# Patient Record
Sex: FEMALE | Race: BLACK OR AFRICAN AMERICAN | NOT HISPANIC OR LATINO | Employment: OTHER | URBAN - METROPOLITAN AREA
[De-identification: names, ages, dates, MRNs, and addresses within clinical notes are randomized per-mention and may not be internally consistent; named-entity substitution may affect disease eponyms.]

---

## 2017-06-29 ENCOUNTER — OFFICE VISIT (OUTPATIENT)
Dept: UROLOGY | Facility: CLINIC | Age: 67
End: 2017-06-29
Payer: MEDICARE

## 2017-06-29 VITALS
BODY MASS INDEX: 31.42 KG/M2 | HEART RATE: 68 BPM | HEIGHT: 64 IN | WEIGHT: 184.06 LBS | DIASTOLIC BLOOD PRESSURE: 70 MMHG | SYSTOLIC BLOOD PRESSURE: 112 MMHG | RESPIRATION RATE: 14 BRPM

## 2017-06-29 DIAGNOSIS — N28.1 RENAL CYST: Primary | ICD-10-CM

## 2017-06-29 PROCEDURE — 99999 PR PBB SHADOW E&M-EST. PATIENT-LVL III: CPT | Mod: PBBFAC,,, | Performed by: UROLOGY

## 2017-06-29 PROCEDURE — 99213 OFFICE O/P EST LOW 20 MIN: CPT | Mod: PBBFAC | Performed by: UROLOGY

## 2017-06-29 PROCEDURE — 1126F AMNT PAIN NOTED NONE PRSNT: CPT | Mod: ,,, | Performed by: UROLOGY

## 2017-06-29 PROCEDURE — 99213 OFFICE O/P EST LOW 20 MIN: CPT | Mod: S$PBB,,, | Performed by: UROLOGY

## 2017-06-29 PROCEDURE — 1159F MED LIST DOCD IN RCRD: CPT | Mod: ,,, | Performed by: UROLOGY

## 2017-06-29 RX ORDER — HYDRALAZINE HYDROCHLORIDE 25 MG/1
TABLET, FILM COATED ORAL
COMMUNITY
Start: 2017-05-01

## 2017-06-29 RX ORDER — SPIRONOLACTONE 25 MG/1
TABLET ORAL
COMMUNITY
Start: 2017-05-05

## 2017-06-29 NOTE — PROGRESS NOTES
" Samira Moon is a 67 y.o. female who is an established pt who was referred by Dr Goodson for evaluation of renal cyst and abnormal bladder US.      She underwent renal complete US for known renal cysts. ANGY (10/16/14) showed 5cm exophytic cyst in R upper pole. No L renal cysts identified. No hydronephrosis. No stones noted. US did mention "layering hyperechogenicity within bladder lumen, which could represent debris within the bladder lumen. No bladder wall thickening is clearly identified." She was sent for possible cystoscopy for this.     She does not notice sediment in urine. No issues with UTI. Rare UUI, not bothersome. No hematuria, dysuria. Cr - 0.67.    Ucx and cytology negative from last visit. No LUTS.    Complete ANGY was done 12/15/15 that showed large pelvic mass. CT was then ordered that showed 7.6cm mass that was likely an ovarian teratoma (12/30/15). She was notified via telephone. She was recommended to see gyn for this.      ANGY also showed suspicious for 1.1cm stone in LLP. Large simple cyst in R UP (5.1cm - stable). 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     ANGY at  5/17 - 5.5cm simple cyst in R UP, no comment on any stones.       The following portions of the patient's history were reviewed and updated as appropriate: allergies, current medications, past family history, past medical history, past social history, past surgical history and problem list.    Review of Systems  Constitutional: no fever or chills  ENT: no nasal congestion or sore throat  Respiratory: no cough or shortness of breath  Cardiovascular: no chest pain or palpitations  Gastrointestinal: no nausea or vomiting, tolerating diet  Genitourinary: as per HPI  Hematologic/Lymphatic: no easy bruising or lymphadenopathy  Musculoskeletal: no arthralgias or myalgias  Skin: no rashes or lesions  Neurological: no seizures or tremors  Behavioral/Psych: " "no auditory or visual hallucinations       Objective:    Vitals: /70   Pulse 68   Resp 14   Ht 5' 4" (1.626 m)   Wt 83.5 kg (184 lb 1.4 oz)   BMI 31.60 kg/m²     Physical Exam   General: well developed, well nourished in no acute distress  Head: normocephalic, atraumatic  Neck: supple, trachea midline  HEENT: EOMI, mucus membranes moist, sclera anicteric, no hearing impairment  Lungs: symmetric expansion, non-labored breathing  Cardiovascular: regular rate and rhythm, normal pulses  Abdomen: soft, non tender, non distended, no palpable masses, no hernias  Musculoskeletal: no peripheral edema, normal ROM  Lymphatics: no cervical or inguinal lymphadenopathy  Skin: no rashes or lesions  Neuro: alert and oriented x 3, no gross deficits  Psych: normal judgment and insight, normal mood/affect and non-anxious  Genitourinary:   patient declined exam      Lab Review   Urine analysis shows positive for trace ketones, 5-10 RBCs    Lab Results   Component Value Date    WBC 4.92 02/20/2009    HGB 13.1 02/20/2009    HCT 40.3 02/20/2009    MCV 84.7 02/20/2009     02/20/2009     Lab Results   Component Value Date    CREATININE 0.7 12/30/2015    BUN 9 05/15/2008       Imaging  ANGY/CT reviewed         Assessment/Plan:      1. Renal cyst   - Simple cyst, stable over 1 year. She requests f/u imaging of this in 1 year that I think is completely reasonable.    - Calcification on US appears to be vascular rather than in collecting system   - No need for cystoscopy at this time.   - Large pelvic mass - ovarian teratoma. S/p REE-BSO last year.        Follow up in 1 year      "

## 2019-12-02 ENCOUNTER — OFFICE VISIT (OUTPATIENT)
Dept: UROLOGY | Facility: CLINIC | Age: 69
End: 2019-12-02
Attending: UROLOGY
Payer: MEDICARE

## 2019-12-02 VITALS
BODY MASS INDEX: 32.44 KG/M2 | RESPIRATION RATE: 16 BRPM | HEIGHT: 64 IN | DIASTOLIC BLOOD PRESSURE: 60 MMHG | HEART RATE: 64 BPM | WEIGHT: 190 LBS | SYSTOLIC BLOOD PRESSURE: 100 MMHG

## 2019-12-02 DIAGNOSIS — N28.1 RENAL CYST: Primary | ICD-10-CM

## 2019-12-02 PROCEDURE — 1101F PT FALLS ASSESS-DOCD LE1/YR: CPT | Mod: CPTII,S$GLB,, | Performed by: UROLOGY

## 2019-12-02 PROCEDURE — 1159F MED LIST DOCD IN RCRD: CPT | Mod: S$GLB,,, | Performed by: UROLOGY

## 2019-12-02 PROCEDURE — 99999 PR PBB SHADOW E&M-EST. PATIENT-LVL III: CPT | Mod: PBBFAC,,, | Performed by: UROLOGY

## 2019-12-02 PROCEDURE — 99213 PR OFFICE/OUTPT VISIT, EST, LEVL III, 20-29 MIN: ICD-10-PCS | Mod: S$GLB,,, | Performed by: UROLOGY

## 2019-12-02 PROCEDURE — 1159F PR MEDICATION LIST DOCUMENTED IN MEDICAL RECORD: ICD-10-PCS | Mod: S$GLB,,, | Performed by: UROLOGY

## 2019-12-02 PROCEDURE — 99999 PR PBB SHADOW E&M-EST. PATIENT-LVL III: ICD-10-PCS | Mod: PBBFAC,,, | Performed by: UROLOGY

## 2019-12-02 PROCEDURE — 99213 OFFICE O/P EST LOW 20 MIN: CPT | Mod: S$GLB,,, | Performed by: UROLOGY

## 2019-12-02 PROCEDURE — 1126F AMNT PAIN NOTED NONE PRSNT: CPT | Mod: S$GLB,,, | Performed by: UROLOGY

## 2019-12-02 PROCEDURE — 1101F PR PT FALLS ASSESS DOC 0-1 FALLS W/OUT INJ PAST YR: ICD-10-PCS | Mod: CPTII,S$GLB,, | Performed by: UROLOGY

## 2019-12-02 PROCEDURE — 1126F PR PAIN SEVERITY QUANTIFIED, NO PAIN PRESENT: ICD-10-PCS | Mod: S$GLB,,, | Performed by: UROLOGY

## 2019-12-02 RX ORDER — PSYLLIUM HUSK 0.4 G
600 CAPSULE ORAL
COMMUNITY
End: 2021-06-08

## 2019-12-02 RX ORDER — MAGNESIUM 250 MG
250 TABLET ORAL
COMMUNITY

## 2019-12-02 RX ORDER — DEXTROMETHORPHAN HYDROBROMIDE, GUAIFENESIN 5; 100 MG/5ML; MG/5ML
650 LIQUID ORAL
COMMUNITY

## 2019-12-02 RX ORDER — ALPHA LIPOIC ACID 300 MG
300 CAPSULE ORAL
COMMUNITY

## 2019-12-02 RX ORDER — ROSUVASTATIN CALCIUM 20 MG/1
TABLET, COATED ORAL
COMMUNITY
Start: 2019-10-23

## 2019-12-02 RX ORDER — BIOTIN 1 MG
800 TABLET ORAL
COMMUNITY
End: 2023-05-01

## 2019-12-02 RX ORDER — GLUCOSAMINE/D3/BOSWELLIA SERRA 1500MG-400
TABLET ORAL
COMMUNITY
End: 2021-06-08

## 2019-12-02 RX ORDER — UREA 10 %
500 LOTION (ML) TOPICAL
COMMUNITY

## 2019-12-02 RX ORDER — METOPROLOL SUCCINATE 100 MG/1
TABLET, EXTENDED RELEASE ORAL
COMMUNITY
Start: 2019-10-23 | End: 2019-12-02 | Stop reason: SDUPTHER

## 2019-12-02 RX ORDER — AMPICILLIN TRIHYDRATE 250 MG
600 CAPSULE ORAL
COMMUNITY

## 2019-12-02 RX ORDER — EPINEPHRINE 0.22MG
100 AEROSOL WITH ADAPTER (ML) INHALATION
COMMUNITY

## 2019-12-02 RX ORDER — MULTIVITAMIN
1 TABLET ORAL
COMMUNITY

## 2019-12-02 RX ORDER — GUAIFENESIN AND PHENYLEPHRINE HCL 400; 10 MG/1; MG/1
500 TABLET ORAL
COMMUNITY

## 2019-12-02 RX ORDER — ACETAMINOPHEN, DIPHENHYDRAMINE HCL, PHENYLEPHRINE HCL 325; 25; 5 MG/1; MG/1; MG/1
10 TABLET ORAL
COMMUNITY

## 2019-12-02 NOTE — PROGRESS NOTES
" Samira Moon is a 69 y.o. female who is an established pt who was referred by Dr Goodson for evaluation of renal cyst and abnormal bladder US.      She underwent renal complete US for known renal cysts. ANGY (10/16/14) showed 5cm exophytic cyst in R upper pole. No L renal cysts identified. No hydronephrosis. No stones noted. US did mention "layering hyperechogenicity within bladder lumen, which could represent debris within the bladder lumen. No bladder wall thickening is clearly identified." She was sent for possible cystoscopy for this.     She does not notice sediment in urine. No issues with UTI. Rare UUI, not bothersome. No hematuria, dysuria. Cr - 0.67.    Ucx and cytology negative from last visit. No LUTS.    Complete ANGY was done 12/15/15 that showed large pelvic mass. CT was then ordered that showed 7.6cm mass that was likely an ovarian teratoma (12/30/15). She was notified via telephone. She was recommended to see gyn for this.      ANGY also showed suspicious for 1.1cm stone in LLP. Large simple cyst in R UP (5.1cm - stable). 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     ANGY at  5/17 - 5.5cm simple cyst in R UP, no comment on any stones.     12/2/2019  Last seen > 2 years ago. No recent imaging performed. Denies any  issues.       The following portions of the patient's history were reviewed and updated as appropriate: allergies, current medications, past family history, past medical history, past social history, past surgical history and problem list.    Review of Systems  Constitutional: no fever or chills  ENT: no nasal congestion or sore throat  Respiratory: no cough or shortness of breath  Cardiovascular: no chest pain or palpitations  Gastrointestinal: no nausea or vomiting, tolerating diet  Genitourinary: as per HPI  Hematologic/Lymphatic: no easy bruising or lymphadenopathy  Musculoskeletal: no arthralgias or " "myalgias  Skin: no rashes or lesions  Neurological: no seizures or tremors  Behavioral/Psych: no auditory or visual hallucinations       Objective:    Vitals: /60   Pulse 64   Resp 16   Ht 5' 4" (1.626 m)   Wt 86.2 kg (190 lb)   BMI 32.61 kg/m²     Physical Exam   General: well developed, well nourished in no acute distress  Head: normocephalic, atraumatic  Neck: supple, trachea midline  HEENT: EOMI, mucus membranes moist, sclera anicteric, no hearing impairment  Lungs: symmetric expansion, non-labored breathing  Cardiovascular: regular rate and rhythm, normal pulses  Abdomen: soft, non tender, non distended, no palpable masses, no hernias  Musculoskeletal: no peripheral edema, normal ROM  Lymphatics: no cervical or inguinal lymphadenopathy  Skin: no rashes or lesions  Neuro: alert and oriented x 3, no gross deficits  Psych: normal judgment and insight, normal mood/affect and non-anxious  Genitourinary:   patient declined exam      Lab Review   Urine analysis shows positive for trace protein    Lab Results   Component Value Date    WBC 4.92 02/20/2009    HGB 13.1 02/20/2009    HCT 40.3 02/20/2009    MCV 84.7 02/20/2009     02/20/2009     Lab Results   Component Value Date    CREATININE 0.7 12/30/2015    BUN 9 05/15/2008       Imaging  ANGY/CT reviewed         Assessment/Plan:      1. Renal cyst   - Simple cyst, stable over 1 year. She requests f/u imaging of this in 1 year that I think is completely reasonable - she did not follow up until now > 2 years later   - Calcification on US appears to be vascular rather than in collecting system   - No need for cystoscopy at this time.   - Large pelvic mass - ovarian teratoma. S/p REE-BSO 2016.    - Will arrange for ANGY now and 1 year       Follow up in 1 year      "

## 2019-12-04 ENCOUNTER — HOSPITAL ENCOUNTER (OUTPATIENT)
Dept: RADIOLOGY | Facility: HOSPITAL | Age: 69
Discharge: HOME | End: 2019-12-04
Attending: ORTHOPAEDIC SURGERY
Payer: MEDICARE

## 2019-12-04 ENCOUNTER — OFFICE VISIT (OUTPATIENT)
Dept: ORTHOPEDICS | Facility: CLINIC | Age: 69
End: 2019-12-04
Payer: MEDICARE

## 2019-12-04 DIAGNOSIS — M72.2 PLANTAR FASCIITIS OF LEFT FOOT: ICD-10-CM

## 2019-12-04 DIAGNOSIS — M79.672 FOOT PAIN, BILATERAL: Primary | ICD-10-CM

## 2019-12-04 DIAGNOSIS — M79.672 ARCH PAIN OF LEFT FOOT: ICD-10-CM

## 2019-12-04 DIAGNOSIS — M79.671 FOOT PAIN, BILATERAL: ICD-10-CM

## 2019-12-04 DIAGNOSIS — M79.671 FOOT PAIN, BILATERAL: Primary | ICD-10-CM

## 2019-12-04 DIAGNOSIS — M79.672 FOOT PAIN, BILATERAL: ICD-10-CM

## 2019-12-04 PROCEDURE — 99203 PR OFFICE/OUTPT VISIT, NEW, LEVL III, 30-44 MIN: ICD-10-PCS | Mod: S$GLB,,, | Performed by: ORTHOPAEDIC SURGERY

## 2019-12-04 PROCEDURE — 73630 X-RAY EXAM OF FOOT: CPT | Mod: 50,TC

## 2019-12-04 PROCEDURE — 99999 PR PBB SHADOW E&M-EST. PATIENT-LVL II: CPT | Mod: PBBFAC,,, | Performed by: ORTHOPAEDIC SURGERY

## 2019-12-04 PROCEDURE — 99999 PR PBB SHADOW E&M-EST. PATIENT-LVL II: ICD-10-PCS | Mod: PBBFAC,,, | Performed by: ORTHOPAEDIC SURGERY

## 2019-12-04 PROCEDURE — 73630 XR FOOT COMPLETE 3 VIEW BILATERAL: ICD-10-PCS | Mod: 26,50,, | Performed by: RADIOLOGY

## 2019-12-04 PROCEDURE — 1101F PR PT FALLS ASSESS DOC 0-1 FALLS W/OUT INJ PAST YR: ICD-10-PCS | Mod: CPTII,S$GLB,, | Performed by: ORTHOPAEDIC SURGERY

## 2019-12-04 PROCEDURE — 73630 X-RAY EXAM OF FOOT: CPT | Mod: 26,50,, | Performed by: RADIOLOGY

## 2019-12-04 PROCEDURE — 1101F PT FALLS ASSESS-DOCD LE1/YR: CPT | Mod: CPTII,S$GLB,, | Performed by: ORTHOPAEDIC SURGERY

## 2019-12-04 PROCEDURE — 99203 OFFICE O/P NEW LOW 30 MIN: CPT | Mod: S$GLB,,, | Performed by: ORTHOPAEDIC SURGERY

## 2019-12-04 PROCEDURE — 1159F PR MEDICATION LIST DOCUMENTED IN MEDICAL RECORD: ICD-10-PCS | Mod: S$GLB,,, | Performed by: ORTHOPAEDIC SURGERY

## 2019-12-04 PROCEDURE — 1159F MED LIST DOCD IN RCRD: CPT | Mod: S$GLB,,, | Performed by: ORTHOPAEDIC SURGERY

## 2019-12-04 NOTE — PROGRESS NOTES
DATE: 12/4/2019  PATIENT: Samira Moon    CHIEF COMPLAINT: bilateral foot pain    HISTORY:  Samira Moon is a 69 y.o. female  here for initial evaluation of left worse than right foot pain. The pain has been present for 3 months. The patient describes the pain as a pea under her arch.  The pain is worse with walking, worst with first step of the day and improved by rest. There is no associated numbness and tingling.  Prior treatments have included steroid dose pack which helped.      PAST MEDICAL/SURGICAL HISTORY:  Past Medical History:   Diagnosis Date    Cancer     right breast    GERD (gastroesophageal reflux disease)     Hyperlipemia     Hypertension      Past Surgical History:   Procedure Laterality Date    BREAST SURGERY Right     lumpectomy w/sentinel node biopsy    JOINT REPLACEMENT Left     knee    TOTAL KNEE ARTHROPLASTY      TUBAL LIGATION         Current Medications:   Current Outpatient Medications:     acetaminophen (TYLENOL) 650 MG TbSR, Take 650 mg by mouth., Disp: , Rfl:     alpha lipoic acid 300 mg Cap, Take 300 mg by mouth., Disp: , Rfl:     amlodipine (NORVASC) 10 MG tablet, , Disp: , Rfl:     anastrozole (ARIMIDEX) 1 mg Tab, , Disp: , Rfl:     aspirin (ECOTRIN) 81 MG EC tablet, Take 81 mg by mouth once daily., Disp: , Rfl:     calcium carbonate-vitamin D3 1,000 mg(2,500 mg)-800 unit Tab, Take 600 mg by mouth., Disp: , Rfl:     chromium picolinate 400 mcg Tab, Take 800 mcg by mouth., Disp: , Rfl:     cloNIDine (CATAPRES) 0.2 MG tablet, , Disp: , Rfl:     coenzyme Q10 100 mg capsule, Take 100 mg by mouth., Disp: , Rfl:     docusate sodium (COLACE) 50 MG capsule, Take by mouth., Disp: , Rfl:     FLUZONE HIGH-DOSE 2019-20, PF, 180 mcg/0.5 mL Syrg, TO BE ADMINISTERED BY PHARMACIST FOR IMMUNIZATION, Disp: , Rfl: 0    furosemide (LASIX) 20 MG tablet, , Disp: , Rfl:     glucosamine-D3-Boswellia serr 1,500-400-100 mg-unit-mg Tab, Take by mouth., Disp: , Rfl:     hydrALAZINE  (APRESOLINE) 25 MG tablet, , Disp: , Rfl:     KRILL OIL ORAL, Take by mouth., Disp: , Rfl:     levOCARNitine tartrate (CARNITOR) 250 mg Cap, Take 500 mg by mouth., Disp: , Rfl:     losartan-hydrochlorothiazide 100-25 mg (HYZAAR) 100-25 mg per tablet, , Disp: , Rfl:     magnesium 250 mg Tab, Take 250 mg by mouth., Disp: , Rfl:     melatonin 10 mg Tab, Take 10 mg by mouth., Disp: , Rfl:     metoprolol succinate (TOPROL-XL) 100 MG 24 hr tablet, , Disp: , Rfl:     minoxidil (LONITEN) 2.5 MG tablet, , Disp: , Rfl:     multivitamin (THERAGRAN) per tablet, Take 1 tablet by mouth., Disp: , Rfl:     red yeast rice 600 mg Cap, Take 600 mg by mouth., Disp: , Rfl:     rosuvastatin (CRESTOR) 20 MG tablet, , Disp: , Rfl:     spironolactone (ALDACTONE) 25 MG tablet, , Disp: , Rfl:     tramadol (ULTRAM) 50 mg tablet, Take 50 mg by mouth every 6 (six) hours as needed for Pain., Disp: , Rfl:     turmeric root extract 500 mg Cap, Take 500 mg by mouth., Disp: , Rfl:     Social History:   Social History     Socioeconomic History    Marital status:      Spouse name: Not on file    Number of children: Not on file    Years of education: Not on file    Highest education level: Not on file   Occupational History    Not on file   Social Needs    Financial resource strain: Not on file    Food insecurity:     Worry: Not on file     Inability: Not on file    Transportation needs:     Medical: Not on file     Non-medical: Not on file   Tobacco Use    Smoking status: Never Smoker    Smokeless tobacco: Never Used   Substance and Sexual Activity    Alcohol use: No    Drug use: No    Sexual activity: Yes   Lifestyle    Physical activity:     Days per week: Not on file     Minutes per session: Not on file    Stress: Not on file   Relationships    Social connections:     Talks on phone: Not on file     Gets together: Not on file     Attends Anabaptism service: Not on file     Active member of club or organization: Not  on file     Attends meetings of clubs or organizations: Not on file     Relationship status: Not on file   Other Topics Concern    Not on file   Social History Narrative    Not on file       REVIEW OF SYSTEMS:  Constitution: Negative. Negative for chills, fever and night sweats.   Cardiovascular: Negative for chest pain and syncope.   Respiratory: Negative for cough and shortness of breath.   Gastrointestinal: See HPI. Negative for nausea/vomiting. Negative for abdominal pain.  Genitourinary: See HPI. Negative for discoloration or dysuria.  Skin: Negative for dry skin, itching and rash.   Hematologic/Lymphatic: Negative for bleeding problem. Does not bruise/bleed easily.   Musculoskeletal: Negative for falls and muscle weakness.   Neurological: See HPI. No seizures.   Endocrine: Negative for polydipsia, polyphagia and polyuria.   Allergic/Immunologic: Negative for hives and persistent infections.    PHYSICAL EXAMINATION:    There were no vitals taken for this visit.    General: The patient is a  69 y.o. female in no apparent distress, the patient is orientatied to person, place and time.   Psych: Normal mood and affect  HEENT:  NCAT, sclera nonicteric  Lungs:  Respirations are equal and unlabored.  CV:  2+ bilateral upper and lower extremity pulses.  Skin:  Intact throughout.  Musculoskeletal: No pain with the range of motion of the bilateral hips. No trochanteric tenderness to palpation. No pain with range of motion about the bilateral knees.    Left Lower Extremity Exam    - Skin intact, no deformity, no ecchymoses, no edema  - TTP over medial arch  - Compartments soft and compressible  - ROM full (eversion,inversion,plantar/dorsiflexion)  - TA/EHL/Gastroc/FHL assessed in isolation without deficit  - SILT throughout  - DP and PT palpated  2+  - Capillary Refill <3s    Right Lower Extremity Exam    - Skin intact, no deformity, no ecchymoses, no edema  - TTP over medial arch  - Compartments soft and  compressible  - ROM full (eversion,inversion,plantar/dorsiflexion)  - TA/EHL/Gastroc/FHL assessed in isolation without deficit  - SILT throughout  - DP and PT palpated  2+  - Capillary Refill <3s    Pain with single leg heel rise, but able to perform      IMAGING:     Radiographs of the bilateral feet were ordered and personally reviewed with the patient today. No bony abnormality     ASSESSMENT/PLAN:    Diagnoses and all orders for this visit:    Foot pain, bilateral  -     X-Ray Foot Complete Bilateral; Future    Arch pain of left foot  -     MRI Foot (Midfoot) Left Without Contrast; Future    Plantar fasciitis of left foot  -     MRI Foot (Midfoot) Left Without Contrast; Future      No follow-ups on file.    MRI left midfoot  Will call with results    I have personally taken the history and examined this patient and agree with the residents note as stated above.  This patient has atypical plantar foot pain without any history of injury.  She describes the feeling of a mass lesion on the plantar aspect of her foot. This may be related to her plantar fascia, but I am not sure so I would like to obtain an MRI of her left foot which is more symptomatic than the right

## 2019-12-06 ENCOUNTER — HOSPITAL ENCOUNTER (OUTPATIENT)
Dept: RADIOLOGY | Facility: HOSPITAL | Age: 69
Discharge: HOME | End: 2019-12-06
Attending: UROLOGY
Payer: MEDICARE

## 2019-12-06 ENCOUNTER — HOSPITAL ENCOUNTER (OUTPATIENT)
Dept: RADIOLOGY | Facility: HOSPITAL | Age: 69
Discharge: HOME | End: 2019-12-06
Attending: ORTHOPAEDIC SURGERY
Payer: MEDICARE

## 2019-12-06 DIAGNOSIS — N28.1 RENAL CYST: ICD-10-CM

## 2019-12-06 DIAGNOSIS — M72.2 PLANTAR FASCIITIS OF LEFT FOOT: ICD-10-CM

## 2019-12-06 DIAGNOSIS — M79.672 ARCH PAIN OF LEFT FOOT: ICD-10-CM

## 2019-12-06 PROCEDURE — 76770 US EXAM ABDO BACK WALL COMP: CPT | Mod: TC

## 2019-12-06 PROCEDURE — 73718 MRI LOWER EXTREMITY W/O DYE: CPT | Mod: 26,LT,, | Performed by: RADIOLOGY

## 2019-12-06 PROCEDURE — 76770 US RETROPERITONEAL COMPLETE: ICD-10-PCS | Mod: 26,,, | Performed by: RADIOLOGY

## 2019-12-06 PROCEDURE — 76770 US EXAM ABDO BACK WALL COMP: CPT | Mod: 26,,, | Performed by: RADIOLOGY

## 2019-12-06 PROCEDURE — 73718 MRI FOOT (MIDFOOT) LEFT WITHOUT CONTRAST: ICD-10-PCS | Mod: 26,LT,, | Performed by: RADIOLOGY

## 2019-12-06 PROCEDURE — 73718 MRI LOWER EXTREMITY W/O DYE: CPT | Mod: TC,LT

## 2019-12-09 ENCOUNTER — TELEPHONE (OUTPATIENT)
Dept: UROLOGY | Facility: CLINIC | Age: 69
End: 2019-12-09

## 2019-12-09 NOTE — TELEPHONE ENCOUNTER
Please inform pt:    ANGY was normal. Simple cysts noted in R kidney - no need for intervention or surveillance of this.

## 2019-12-09 NOTE — TELEPHONE ENCOUNTER
Spoke to pt advised ultrasound normal just showed simple cyst not intervention needed at this time.[email or web address]

## 2019-12-16 ENCOUNTER — OFFICE VISIT (OUTPATIENT)
Dept: ORTHOPEDICS | Facility: CLINIC | Age: 69
End: 2019-12-16
Payer: MEDICARE

## 2019-12-16 DIAGNOSIS — M19.079 ARTHRITIS OF FOOT: Primary | ICD-10-CM

## 2019-12-16 PROCEDURE — 99999 PR PBB SHADOW E&M-EST. PATIENT-LVL II: CPT | Mod: PBBFAC,,, | Performed by: ORTHOPAEDIC SURGERY

## 2019-12-16 PROCEDURE — 99213 PR OFFICE/OUTPT VISIT, EST, LEVL III, 20-29 MIN: ICD-10-PCS | Mod: S$GLB,,, | Performed by: ORTHOPAEDIC SURGERY

## 2019-12-16 PROCEDURE — 99213 OFFICE O/P EST LOW 20 MIN: CPT | Mod: S$GLB,,, | Performed by: ORTHOPAEDIC SURGERY

## 2019-12-16 PROCEDURE — 1159F MED LIST DOCD IN RCRD: CPT | Mod: S$GLB,,, | Performed by: ORTHOPAEDIC SURGERY

## 2019-12-16 PROCEDURE — 1101F PR PT FALLS ASSESS DOC 0-1 FALLS W/OUT INJ PAST YR: ICD-10-PCS | Mod: CPTII,S$GLB,, | Performed by: ORTHOPAEDIC SURGERY

## 2019-12-16 PROCEDURE — 1101F PT FALLS ASSESS-DOCD LE1/YR: CPT | Mod: CPTII,S$GLB,, | Performed by: ORTHOPAEDIC SURGERY

## 2019-12-16 PROCEDURE — 1159F PR MEDICATION LIST DOCUMENTED IN MEDICAL RECORD: ICD-10-PCS | Mod: S$GLB,,, | Performed by: ORTHOPAEDIC SURGERY

## 2019-12-16 PROCEDURE — 99999 PR PBB SHADOW E&M-EST. PATIENT-LVL II: ICD-10-PCS | Mod: PBBFAC,,, | Performed by: ORTHOPAEDIC SURGERY

## 2019-12-16 NOTE — PROGRESS NOTES
Samira Moon  Returns today for follow-up of the results of her left foot MRI.  This is a 69-year-old female presented to me with bilateral foot pain left worse than right but similar in location and nature.  She complained of pain mainly in the mid part of her foot and arch.  I did not appreciate any obvious structural abnormalities by exam or plain x-ray so I ordered an MRI of her left foot which was her more symptomatic foot.    MRI results:  Findings on the MRI which may correspond to her pain include some degenerative changes of the midfoot as well as some mild thickening of the medial band of the plantar fascia. Otherwise there were no other structural abnormalities that would correlate with her pain.    Her examination remains unchanged from her exam on her last visit    Impression:  1.  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  Possible mild plantar fasciitis    Recommendation:  There are no structural abnormalities that warrant any surgical intervention. I recommended conservative measures for arthritis including nonsteroidal anti-inflammatory medications if she can tolerate them as well as wearing supportive shoes.    Follow-up as needed

## 2020-01-28 ENCOUNTER — OFFICE VISIT (OUTPATIENT)
Dept: ORTHOPEDICS | Facility: CLINIC | Age: 70
End: 2020-01-28
Payer: MEDICARE

## 2020-01-28 DIAGNOSIS — M72.2 PLANTAR FASCIITIS OF LEFT FOOT: ICD-10-CM

## 2020-01-28 DIAGNOSIS — M76.72 PERONEAL TENDINITIS OF LEFT LOWER EXTREMITY: Primary | ICD-10-CM

## 2020-01-28 PROCEDURE — 1101F PR PT FALLS ASSESS DOC 0-1 FALLS W/OUT INJ PAST YR: ICD-10-PCS | Mod: CPTII,S$GLB,, | Performed by: ORTHOPAEDIC SURGERY

## 2020-01-28 PROCEDURE — 1101F PT FALLS ASSESS-DOCD LE1/YR: CPT | Mod: CPTII,S$GLB,, | Performed by: ORTHOPAEDIC SURGERY

## 2020-01-28 PROCEDURE — 99999 PR PBB SHADOW E&M-EST. PATIENT-LVL II: CPT | Mod: PBBFAC,,, | Performed by: ORTHOPAEDIC SURGERY

## 2020-01-28 PROCEDURE — 99999 PR PBB SHADOW E&M-EST. PATIENT-LVL II: ICD-10-PCS | Mod: PBBFAC,,, | Performed by: ORTHOPAEDIC SURGERY

## 2020-01-28 PROCEDURE — 99213 PR OFFICE/OUTPT VISIT, EST, LEVL III, 20-29 MIN: ICD-10-PCS | Mod: S$GLB,,, | Performed by: ORTHOPAEDIC SURGERY

## 2020-01-28 PROCEDURE — 99213 OFFICE O/P EST LOW 20 MIN: CPT | Mod: S$GLB,,, | Performed by: ORTHOPAEDIC SURGERY

## 2020-01-28 PROCEDURE — 1159F MED LIST DOCD IN RCRD: CPT | Mod: S$GLB,,, | Performed by: ORTHOPAEDIC SURGERY

## 2020-01-28 PROCEDURE — 1159F PR MEDICATION LIST DOCUMENTED IN MEDICAL RECORD: ICD-10-PCS | Mod: S$GLB,,, | Performed by: ORTHOPAEDIC SURGERY

## 2020-01-28 NOTE — PROGRESS NOTES
Samira Moon  Returns today for follow-up.  This is a 69-year-old female has had various complaints of her feet and mostly her left foot. She was last here about 6 weeks ago for results of an MRI to evaluate her left foot. The MRI revealed some degenerative changes of the midfoot as well as some mild plantar fasciitis which correlated with some of her symptoms. The MRI also reported some tenosynovitis of the peroneus longus tendon. She states that this past Sunday she had a sharp pain on the lateral plantar aspect of her foot coming out of [place] which show most made her fall.  She states she has had this pain before and it lasted for about 3 hours.  She comes in for evaluation.    Examination:  She walks in with a normal gait. On standing inspection she has plantigrade alignment of her feet.  There is no obvious swelling of either foot. On sitting exam the left foot and ankle reveals full motion of the ankle:  Subtalar joint. She has good function of her peroneal tendons but does report some pain in the region of her symptoms with resistance to plantar flexion of her 1st ray.  This would correlate with peroneus longus tendonitis.  She has minimal tenderness in the midfoot and plantar fascia at this time.    Imaging:  I re-reviewed the MRI and indeed there is some fluid along the course of the peroneus longus tendon as extends into the plantar aspect of the foot.    Impression:  1.  Left peroneal tendinitis                       2.  Left mild midfoot arthritis                       3.  Left plantar fasciitis    Recommendation:  Her recent symptoms and current signs would correlate with peroneus longus pathology. I explained to her that if this persists that this is a tendon that could be removed without any significant functional deficit.  As it only occurs intermittently I suggest that she obtain a cane to use in public to help prevent any potential falls.  I also recommended physical therapy to address the  tendinitis.    Follow-up as needed

## 2020-02-17 ENCOUNTER — CLINICAL SUPPORT (OUTPATIENT)
Dept: REHABILITATION | Facility: HOSPITAL | Age: 70
End: 2020-02-17
Attending: ORTHOPAEDIC SURGERY
Payer: MEDICARE

## 2020-02-17 DIAGNOSIS — M79.672 BILATERAL FOOT PAIN: ICD-10-CM

## 2020-02-17 DIAGNOSIS — M79.671 BILATERAL FOOT PAIN: ICD-10-CM

## 2020-02-17 DIAGNOSIS — R26.89 DECREASED MOBILITY: ICD-10-CM

## 2020-02-17 PROCEDURE — 97140 MANUAL THERAPY 1/> REGIONS: CPT | Mod: PN

## 2020-02-17 PROCEDURE — 97161 PT EVAL LOW COMPLEX 20 MIN: CPT | Mod: PN

## 2020-02-17 NOTE — PLAN OF CARE
[facility]   PATIENT EVALUATION    Name: Samira oMon  Clinic Number: [id number]    Diagnosis:   Encounter Diagnoses   Name Primary?    Bilateral foot pain     Decreased mobility      Physician: Dayron Lechuga MD  Treatment Orders: PT Eval and Treat    History     Past Medical History:   Diagnosis Date    Cancer     right breast    GERD (gastroesophageal reflux disease)     Hyperlipemia     Hypertension      Current Outpatient Medications   Medication Sig    acetaminophen (TYLENOL) 650 MG TbSR Take 650 mg by mouth.    alpha lipoic acid 300 mg Cap Take 300 mg by mouth.    amlodipine (NORVASC) 10 MG tablet     anastrozole (ARIMIDEX) 1 mg Tab     aspirin (ECOTRIN) 81 MG EC tablet Take 81 mg by mouth once daily.    calcium carbonate-vitamin D3 1,000 mg(2,500 mg)-800 unit Tab Take 600 mg by mouth.    chromium picolinate 400 mcg Tab Take 800 mcg by mouth.    cloNIDine (CATAPRES) 0.2 MG tablet     coenzyme Q10 100 mg capsule Take 100 mg by mouth.    docusate sodium (COLACE) 50 MG capsule Take by mouth.    FLUZONE HIGH-DOSE 2019-20, PF, 180 mcg/0.5 mL Syrg TO BE ADMINISTERED BY PHARMACIST FOR IMMUNIZATION    furosemide (LASIX) 20 MG tablet     glucosamine-D3-Boswellia serr 1,500-400-100 mg-unit-mg Tab Take by mouth.    hydrALAZINE (APRESOLINE) 25 MG tablet     KRILL OIL ORAL Take by mouth.    levOCARNitine tartrate (CARNITOR) 250 mg Cap Take 500 mg by mouth.    losartan-hydrochlorothiazide 100-25 mg (HYZAAR) 100-25 mg per tablet     magnesium 250 mg Tab Take 250 mg by mouth.    melatonin 10 mg Tab Take 10 mg by mouth.    metoprolol succinate (TOPROL-XL) 100 MG 24 hr tablet     minoxidil (LONITEN) 2.5 MG tablet     multivitamin (THERAGRAN) per tablet Take 1 tablet by mouth.    red yeast rice 600 mg Cap Take 600 mg by mouth.    rosuvastatin (CRESTOR) 20 MG tablet     spironolactone (ALDACTONE) 25 MG tablet     tramadol (ULTRAM) 50 mg tablet Take 50 mg by mouth every 6 (six)  hours as needed for Pain.    turmeric root extract 500 mg Cap Take 500 mg by mouth.     No current facility-administered medications for this visit.      Review of patient's allergies indicates:   Allergen Reactions    Erythromycin Hives    Penicillins     Oxycodone-acetaminophen Rash       Precautions: standard    Evaluation Date: 2/17/20  Start Time: 1600  Stop Time: 1648  Visit # authorized: 1/1  Authorization period: 1/27/21  Plan of care expiration: 3/30/20  MD referral: Dr. Lechuga    Hx of present illness: Patient bought some new expensive tennis shoes about 6 months ago. She wore them a week or two and her feet got progressively worse. Patient went to podiatrist 2x and to orthopedic surgeon. She received x-ray and MRI. Podiatrist gave her steroid pack which only helped while she was taking it. She has no hx of similar sx and no other tx for this condition.      Subjective     Samira Moon states she has B foot pain, L worse than R in the arch and the outside edge of the foot. She has increased pain stepping on uneven surfaces and feels like she almost falls it hurts so bad. She took a misstep leaving [place] last week and she would have fallen if she wasn't between 2 men. Reports no falls. Patient thinks she can walk about a mile. Patient reports pain first step in the morning. She has B knee replacements.     PLOF: Typically patient goes to the gym and walks on the TM 6x/week (3.8mph at incline of 8) but she hasn't been able to do this recently    Pain:  Location: lateral foot and arch B  Description: aching, sharp  Activities Which Increase Pain: see above  Activities Which Decrease Pain: aleve  Pain Scale: 4/10 now 4/10 at worst 2/10 at best    Physical Therapy Goals: get rid of pain      Objective     Observation: (standing) Patient ambulated into clinic with no AD    Posture: increased pes planus B    Gait: no obvious abnormalities    Ankle Active/Passive ROM: (measured in degrees) WNL  BLE    Strength: (graded 1-5 out of 5) 5/5 BLE except ankle eversion L 4+/5, ankle PF L 3-/5    Special Tests: negative      Joint Mobility: decreased B cuboid and MT     Palpation: TTP B cuboid and MT base    PT reviewed FOTO scores for Samira Moon on 2/17/20  FOTO score: 40% limited    Functional Limitations Reports - G Codes  Category: mobility  Tool: FOTO      Current ():  CK  Goal (): CJ      TREATMENT time separate from evaluation    Time In: 1630  Time Out: 1645  Therapeutic exercise: Samira received therapeutic exercises to develop strength and endurance, flexibility for 5 minutes including:    Towel scrunches x2 min B  Ankle eversion x30 YTB    NV Add:  4-way ankle  Steamboats on foam  Tandem stance on foam  Step ups on foam    Manual therapy: Samira  received the following manual therapy techniques x 10 min.     Dorsal glides B cuboid and MT base grade 2-3  STM B plantar fascia    Pt. Education: Instructed pt. regarding: proper technique with all exercises, body/gait mechanics, diagnosis, prognosis, goals, and POC. Pt demo good understanding of the education provided. Samira demonstrated good return demonstration of activities. No cultural, environmental, or spiritual barriers identified to treatment or learning.        Assessment   Patient is a 69 y.o. female referred to outpatient physical therapy who presents with a PT diagnosis of B foot pain/decreased mobility demonstrating joint dysfunction and functional limitation as described below. Level of complexity is low;  based on patient's past medical history including the below co-morbidities and personal factors; functional limitations, and clinical presentation directly impacting his/her plan of care. Pt demonstrates excellent rehab potential. Pt will benefit from physcial therapy services in order to maximize pain free functional mobility. The following goals were discussed with the patient and patient is in agreement with them as to be  addressed in the treatment plan. Pt was given a HEP consisting of towel scrunches and ankle eversion. Pt verbally understood the instructions as they were given and demonstrated proper form and technique during therapy. Pt was advised to perform these exercises free of pain, and to stop performing them if pain occurs.       History  Co-morbidities and personal factors that may impact the plan of care Examination  Body Structures and Functions, activity limitations and participation restrictions that may impact the plan of care Clinical Presentation   Decision Making/ Complexity Score   Co-morbidities:   Hx breast cancer            Personal Factors:   age Body Regions: B foot    Body Systems:  musculoskeletal      Activity limitations: walking on uneven surfaces      Participation Restrictions: exercise       stable   low       Medical necessity is demonstrated by the following IMPAIRMENTS/PROBLEM LIST:   1) Pain limiting function   2) decreased balance   3) decreased strength   4) decreased joint mobility   5) Lack of HEP    GOALS:   Short Term Goals:  3 weeks  1.Patient will be proficient and compliant with HEP.  2. Decrease pain at worst to no greater than 2/10.  3. Patient will report no pain upon first step in the morning.      Long Term Goals: 6 weeks  1. Patient will be </= 30% limited in mobility according to FOTO.  2. Patient will demonstrate 5/5 BLE strength in order to improve functional mobility.   3. Patient will demonstrate >/= 15 seconds B SLS in order to decrease fall risk.    Plan     Pt will be treated by physical therapy 1-2 times a week for 6 weeks for Pt education, HEP, therapeutic exercises, neuromuscular re-education, manual therapy, dry needling; and modalities prn to achieve established goals. Samira may at times be seen by a PTA as part of the Rehab Team.     I certify the need for these services furnished under this plan of treatment and while under my care.  ______________________________  Physician/Referring Practitioner  Date of Signature    Fifi Shepard, PT, DPT

## 2020-03-02 ENCOUNTER — CLINICAL SUPPORT (OUTPATIENT)
Dept: REHABILITATION | Facility: HOSPITAL | Age: 70
End: 2020-03-02
Attending: ORTHOPAEDIC SURGERY
Payer: MEDICARE

## 2020-03-02 DIAGNOSIS — M79.671 BILATERAL FOOT PAIN: ICD-10-CM

## 2020-03-02 DIAGNOSIS — M79.672 BILATERAL FOOT PAIN: ICD-10-CM

## 2020-03-02 DIAGNOSIS — R26.89 DECREASED MOBILITY: ICD-10-CM

## 2020-03-02 PROCEDURE — 97110 THERAPEUTIC EXERCISES: CPT | Mod: PN

## 2020-03-02 PROCEDURE — 97140 MANUAL THERAPY 1/> REGIONS: CPT | Mod: PN

## 2020-03-02 NOTE — PROGRESS NOTES
"                                                  Physical Therapy Daily Note     Date: 03/02/2020  Name: Samira Moon  Clinic Number: [id number]  Diagnosis:   Encounter Diagnoses   Name Primary?    Bilateral foot pain     Decreased mobility      Physician: Dayron Lechuga MD    Precautions: standard     Evaluation Date: 2/17/20  Start Time: 1500  Stop Time: 1653  Visit # authorized: 2/5  Authorization period: 3/2/21  Plan of care expiration: 3/30/20  MD referral: Dr. Lechuga     Hx of present illness: Patient bought some new expensive tennis shoes about 6 months ago. She wore them a week or two and her feet got progressively worse. Patient went to podiatrist 2x and to orthopedic surgeon. She received x-ray and MRI. Podiatrist gave her steroid pack which only helped while she was taking it. She has no hx of similar sx and no other tx for this condition.      Subjective     Pt reports 4/10 B foot pain pre-tx. Reports she feels like her feet are getting a bit sensitive. Reports compliance with HEP but is a bit confused about the ankle exercise and would like to review.    Objective       Patient received individual therapy to increase strength, endurance, ROM and flexibility with activities as follows:     Samira received therapeutic exercises to develop strength, endurance, ROM and flexibility for 43 minutes including:     NuStep x5 min  Calf raises 2x10  Calf st on slant board x1 min  Steamboats on foam 2x10 B  Tandem stance on foam 2x30" B  Step ups on foam x15 B  BAPS board level 2 x20 clockwise/counter  Towel scrunches x2 min B (at the same time)  Franco x2 min ea  Ankle eversion RTB x30 B        Manual therapy: Samira  received the following manual therapy techniques x 10 min.     Dorsal glides B cuboid and MT base grade 2-3  IASTM B plantar fascia    Written Home Exercises Provided: no new exercises provided this session  Pt demo good understanding of the education provided. Samira demonstrated good return " demonstration of activities.     Education provided: TRINY Hogan verbalized good understanding of education provided.   No spiritual or educational barriers to learning identified.    Assessment     Patient tolerated tx well. Added various exercises to improve balance, foot intrinsic strength and ROM with no reports of increased pain. Patient challenged with balance exercises and required close supervision with step ups due to not picking up feet enough and during tandem stance for LOB. Patient better able to tolerate manual therapy today with no reports of increased pain. Patient will continue to benefit from skilled PT in order to decrease pain, increase strength/ROM, improve balance, and improve functional mobility.    GOALS:   Short Term Goals:  3 weeks  1.Patient will be proficient and compliant with HEP.  2. Decrease pain at worst to no greater than 2/10.  3. Patient will report no pain upon first step in the morning.        Long Term Goals: 6 weeks  1. Patient will be </= 30% limited in mobility according to FOTO.  2. Patient will demonstrate 5/5 BLE strength in order to improve functional mobility.   3. Patient will demonstrate >/= 15 seconds B SLS in order to decrease fall risk.    Plan   Continue with established Plan of Care towards PT goals.      Therapist: Fifi Shepard, PT, DPT

## 2020-03-11 ENCOUNTER — CLINICAL SUPPORT (OUTPATIENT)
Dept: REHABILITATION | Facility: HOSPITAL | Age: 70
End: 2020-03-11
Attending: ORTHOPAEDIC SURGERY
Payer: MEDICARE

## 2020-03-11 DIAGNOSIS — M79.671 BILATERAL FOOT PAIN: ICD-10-CM

## 2020-03-11 DIAGNOSIS — R26.89 DECREASED MOBILITY: ICD-10-CM

## 2020-03-11 DIAGNOSIS — M79.672 BILATERAL FOOT PAIN: ICD-10-CM

## 2020-03-11 PROCEDURE — 97110 THERAPEUTIC EXERCISES: CPT | Mod: PN,CQ

## 2020-03-11 PROCEDURE — 97140 MANUAL THERAPY 1/> REGIONS: CPT | Mod: PN,CQ

## 2020-03-11 NOTE — PROGRESS NOTES
"                                                  Physical Therapy Daily Note     Date: 03/11/2020  Name: Samira Moon  Clinic Number: [id number]  Diagnosis:   Encounter Diagnoses   Name Primary?    Bilateral foot pain     Decreased mobility      Physician: Dayron Lechuga MD    Precautions: standard     Evaluation Date: 2/17/20  Start Time: 1506  Stop Time: 1600  Visit # authorized: 2/6 (3 total)  Authorization period: 3/2/21  Plan of care expiration: 3/30/20  MD referral: Dr. Lechuga     Hx of present illness: Patient bought some new expensive tennis shoes about 6 months ago. She wore them a week or two and her feet got progressively worse. Patient went to podiatrist 2x and to orthopedic surgeon. She received x-ray and MRI. Podiatrist gave her steroid pack which only helped while she was taking it. She has no hx of similar sx and no other tx for this condition.      Subjective     Pt reports 4/10 B foot pain pre-tx. Reports she feels like her feet are getting a bit sensitive. Reports compliance with HEP.    Objective       Patient received individual therapy to increase strength, endurance, ROM and flexibility with activities as follows:     aSmira received therapeutic exercises to develop strength, endurance, ROM and flexibility for 42 minutes including:     NuStep x5 min  Calf raises 2x10  Calf st on slant board x1 min  Steamboats on foam 2x10 B  Tandem stance on foam 2x30" B  Step ups on foam x15 B  BAPS board level 2 x20 clockwise/counter  Towel scrunches x2 min B (at the same time)  Franco x2 min ea  Ankle eversion RTB x30 B        Manual therapy: Samira  received the following manual therapy techniques x 10 min.     Dorsal glides B cuboid and MT base grade 2-3  STM B plantar fascia    Written Home Exercises Provided: no new exercises provided this session  Pt demo good understanding of the education provided. Samira demonstrated good return demonstration of activities.     Education provided: TRINY Hogan" verbalized good understanding of education provided.   No spiritual or educational barriers to learning identified.    Assessment     Patient tolerated tx well. Added various exercises to improve balance, foot intrinsic strength and ROM with no reports of increased pain. Patient challenged with balance exercises and required close supervision with step ups due to not picking up feet enough and during tandem stance for LOB x3. Patient better able to tolerate manual therapy today with no reports of increased pain. Patient will continue to benefit from skilled PT in order to decrease pain, increase strength/ROM, improve balance, and improve functional mobility.    GOALS:   Short Term Goals:  3 weeks  1.Patient will be proficient and compliant with HEP.  2. Decrease pain at worst to no greater than 2/10.  3. Patient will report no pain upon first step in the morning.        Long Term Goals: 6 weeks  1. Patient will be </= 30% limited in mobility according to FOTO.  2. Patient will demonstrate 5/5 BLE strength in order to improve functional mobility.   3. Patient will demonstrate >/= 15 seconds B SLS in order to decrease fall risk.    Plan   Continue with established Plan of Care towards PT goals.      Therapist: Duke Mcclure PTA, DPT

## 2020-03-13 ENCOUNTER — CLINICAL SUPPORT (OUTPATIENT)
Dept: REHABILITATION | Facility: HOSPITAL | Age: 70
End: 2020-03-13
Attending: ORTHOPAEDIC SURGERY
Payer: MEDICARE

## 2020-03-13 DIAGNOSIS — M79.672 BILATERAL FOOT PAIN: ICD-10-CM

## 2020-03-13 DIAGNOSIS — M79.671 BILATERAL FOOT PAIN: ICD-10-CM

## 2020-03-13 DIAGNOSIS — R26.89 DECREASED MOBILITY: ICD-10-CM

## 2020-03-13 PROCEDURE — 97140 MANUAL THERAPY 1/> REGIONS: CPT | Mod: PN,CQ

## 2020-03-13 PROCEDURE — 97110 THERAPEUTIC EXERCISES: CPT | Mod: PN,CQ

## 2020-03-13 PROCEDURE — 97112 NEUROMUSCULAR REEDUCATION: CPT | Mod: PN,CQ

## 2020-03-13 NOTE — PROGRESS NOTES
"                                                  Physical Therapy Daily Note     Date: 03/13/2020  Name: Samira Moon  Clinic Number: [id number]  Diagnosis:   Encounter Diagnoses   Name Primary?    Bilateral foot pain     Decreased mobility      Physician: Dayron Lechuga MD    Precautions: standard     Evaluation Date: 2/17/20  Start Time: 1500  Stop Time: 1555  Visit # authorized: 3/6 (4 total)  Authorization period: 3/2/21  Plan of care expiration: 3/30/20  MD referral: Dr. Lechuga     Hx of present illness: Patient bought some new expensive tennis shoes about 6 months ago. She wore them a week or two and her feet got progressively worse. Patient went to podiatrist 2x and to orthopedic surgeon. She received x-ray and MRI. Podiatrist gave her steroid pack which only helped while she was taking it. She has no hx of similar sx and no other tx for this condition.      Subjective     Pt reports 3/10 B foot pain pre-tx with reduced symptoms. Reports compliance with HEP.    Objective       Patient received individual therapy to increase strength, endurance, ROM and flexibility with activities as follows:     Samira received therapeutic exercises to develop strength, endurance, ROM and flexibility for 30 minutes including:     NuStep x5 min  Calf raises 2x10  Calf st on slant board x1 min  Steamboats on foam 2x10 B  Tandem stance on foam 2x30" B  Step ups on foam x15 B  BAPS board level 2 x20 clockwise/counter  +Step Ups x 30 6"  Towel scrunches x2 min B (at the same time)  Franco x2 min ea  Ankle eversion RTB x30 B  Ankle inversion RB x30 B  Ankle dorsiflexion RTB x30 B    Samira received neuromuscular re-education for improved proprioception and stability for 15 minutes including:  +SLS 3x1' each  +SLS 2x1' on AirEx  +Semi-tandem Stance with Palloff Press x30 each        Manual therapy: Samira  received the following manual therapy techniques x 10 min.     Dorsal glides B cuboid and MT base grade 2-3  STM B plantar " fascia    Written Home Exercises Provided: no new exercises provided this session  Pt demo good understanding of the education provided. Samira demonstrated good return demonstration of activities.     Education provided: TRINY Hoagn verbalized good understanding of education provided.   No spiritual or educational barriers to learning identified.    Assessment     Patient tolerated tx well. Added various exercises to improve balance, foot intrinsic strength and ROM with no reports of increased pain. Patient challenged with balance exercises and required close supervision with step ups. Pt unable to perform SLS with Palloff press so transitioned to semi-tandem stance to continue to develop instrinsic musculature of B feet.  Patient better able to tolerate manual therapy today with no reports of increased pain.     GOALS:   Short Term Goals:  3 weeks  1.Patient will be proficient and compliant with HEP.  2. Decrease pain at worst to no greater than 2/10.  3. Patient will report no pain upon first step in the morning.        Long Term Goals: 6 weeks  1. Patient will be </= 30% limited in mobility according to FOTO.  2. Patient will demonstrate 5/5 BLE strength in order to improve functional mobility.   3. Patient will demonstrate >/= 15 seconds B SLS in order to decrease fall risk.    Plan   Continue with established Plan of Care towards PT goals.      Therapist: Duke Mcclure, JOCELIN, DPT

## 2020-03-16 ENCOUNTER — CLINICAL SUPPORT (OUTPATIENT)
Dept: REHABILITATION | Facility: HOSPITAL | Age: 70
End: 2020-03-16
Attending: ORTHOPAEDIC SURGERY
Payer: MEDICARE

## 2020-03-16 DIAGNOSIS — M79.672 BILATERAL FOOT PAIN: ICD-10-CM

## 2020-03-16 DIAGNOSIS — M79.671 BILATERAL FOOT PAIN: ICD-10-CM

## 2020-03-16 DIAGNOSIS — R26.89 DECREASED MOBILITY: ICD-10-CM

## 2020-03-16 PROCEDURE — 97140 MANUAL THERAPY 1/> REGIONS: CPT | Mod: PN

## 2020-03-16 PROCEDURE — 97110 THERAPEUTIC EXERCISES: CPT | Mod: PN

## 2020-03-16 NOTE — PROGRESS NOTES
"                                                  Physical Therapy Daily Note     Date: 03/16/2020  Name: Samira Moon  Clinic Number: [id number]  Diagnosis:   Encounter Diagnoses   Name Primary?    Bilateral foot pain     Decreased mobility      Physician: Dayron Lechuga MD    Precautions: standard     Evaluation Date: 2/17/20 PN DUE: 4/16/20  Start Time: 1500  Stop Time: 1602  Visit # authorized: 4/20 (5 total)  Authorization period: 9/4/20  Plan of care expiration: 3/30/20  MD referral: Dr. Lechuga     Hx of present illness: Patient bought some new expensive tennis shoes about 6 months ago. She wore them a week or two and her feet got progressively worse. Patient went to podiatrist 2x and to orthopedic surgeon. She received x-ray and MRI. Podiatrist gave her steroid pack which only helped while she was taking it. She has no hx of similar sx and no other tx for this condition.      Subjective     Pt reports no foot pain pre-tx. She feels like it is getting better overall but she still has some sensitivity. She has pain going to her workout classes and standing/walking around on her toes. Reports her pain at worst is 4/10.    Objective     PROGRESS NOTE    5/5 BLE strength  TTP and tightness B gastroc and plantarfascia    Patient received individual therapy to increase strength, endurance, ROM and flexibility with activities as follows:     Samira received therapeutic exercises to develop strength, endurance, ROM and flexibility for 45 minutes including:     NuStep x5 min  Calf raises 3x10  Calf st on slant board x1 min  Step ups on foam x15 B  BAPS board level 2 x20 clockwise/counter  Step Ups 2x15 B 6"  Towel scrunches x2 min B (at the same time) NP  Franco x2 min ea  Ankle eversion RTB x30 B  Ankle inversion RB x30 B  Ankle dorsiflexion RTB x30 B    Samira received neuromuscular re-education for improved proprioception and stability for 7 minutes including:    Steamboats on foam 2x10 B  Tandem stance on " "foam 2x30" B  SLS 2x1' on AirEx  Semi-tandem Stance with Palloff Press x30 each NP      Manual therapy: Samira  received the following manual therapy techniques x 10 min.     Dorsal glides B cuboid and MT base grade 2-3  IASTM B plantar fascia    Written Home Exercises Provided: gastroc st standing and sitting  Pt demo good understanding of the education provided. Samira demonstrated good return demonstration of activities.     Education provided: RHONDAS  Samira verbalized good understanding of education provided.   No spiritual or educational barriers to learning identified.    Assessment     Patient tolerated tx well. Added gastroc stretch sitting and standing to HEP so patient has more ROM first thing in the morning and following exercise classes. Patient demonstrates improved strength and balance and is making good progress toward goals. Patient with improved manual therapy tolerance, able to tolerate IASTM today. Patient entered tx with box of insoles and asked if she should wear them. Because a change in shoes is what patient attributes to beginning of pain and she has been improving with therapy, PT recommended she doesn't add insoles right away. Patient will continue to benefit from skilled PT in order to decrease pain, increase strength, improve balance, and improve functional mobility.    GOALS:   Short Term Goals:  3 weeks  1.Patient will be proficient and compliant with HEP. *GOAL MET 3/16/20  2. Decrease pain at worst to no greater than 2/10. * in progress  3. Patient will report no pain upon first step in the morning. * in progress        Long Term Goals: 6 weeks  1. Patient will be </= 30% limited in mobility according to FOTO. * in progress  2. Patient will demonstrate 5/5 BLE strength in order to improve functional mobility.  *GOAL MET 3/16/20  3. Patient will demonstrate >/= 15 seconds B SLS in order to decrease fall risk. * in progress    Plan   Continue with established Plan of Care towards PT " goals.      Therapist: Fifi Shepard, PT, DPT

## 2020-03-19 ENCOUNTER — DOCUMENTATION ONLY (OUTPATIENT)
Dept: REHABILITATION | Facility: HOSPITAL | Age: 70
End: 2020-03-19

## 2020-03-19 NOTE — PROGRESS NOTES
Patient: Samira Moon  Date: 3/19/2020  Diagnosis: foot pain  MRN: [id number]    Spoke with patient due to therapy following updates regarding COVID-19 closely and taking every precaution to ensure the safety of our patients, staff and community.  In an abundance of caution and in an effort to help reduce risk and limit community spread, we have decided to temporarily postpone appointments for patients who may be at increased risk to attend in-person therapy or where therapy is not critically needed at this time. Plan of care and home exercise program were reviewed and patient has what they need to continue therapy at home. All patient questions were answered. Also stated to patient that we are exploring virtual methods of providing care and will be in touch over the next few weeks. Patient verbalized understanding to all.    Fifi Shepard  3/19/2020

## 2020-12-15 ENCOUNTER — HOSPITAL ENCOUNTER (OUTPATIENT)
Dept: RADIOLOGY | Facility: HOSPITAL | Age: 70
Discharge: HOME | End: 2020-12-15
Attending: UROLOGY
Payer: MEDICARE

## 2020-12-15 DIAGNOSIS — N28.1 RENAL CYST: ICD-10-CM

## 2020-12-15 PROCEDURE — 76770 US EXAM ABDO BACK WALL COMP: CPT | Mod: TC

## 2020-12-15 PROCEDURE — 76770 US EXAM ABDO BACK WALL COMP: CPT | Mod: 26,,, | Performed by: RADIOLOGY

## 2020-12-15 PROCEDURE — 76770 US RETROPERITONEAL COMPLETE: ICD-10-PCS | Mod: 26,,, | Performed by: RADIOLOGY

## 2021-04-15 ENCOUNTER — PATIENT MESSAGE (OUTPATIENT)
Dept: RESEARCH | Facility: HOSPITAL | Age: 71
End: 2021-04-15

## 2021-06-07 DIAGNOSIS — M25.512 BILATERAL SHOULDER PAIN, UNSPECIFIED CHRONICITY: Primary | ICD-10-CM

## 2021-06-07 DIAGNOSIS — M25.511 BILATERAL SHOULDER PAIN, UNSPECIFIED CHRONICITY: Primary | ICD-10-CM

## 2021-06-08 ENCOUNTER — OFFICE VISIT (OUTPATIENT)
Dept: ORTHOPEDICS | Facility: CLINIC | Age: 71
End: 2021-06-08
Attending: ORTHOPAEDIC SURGERY
Payer: MEDICARE

## 2021-06-08 ENCOUNTER — APPOINTMENT (OUTPATIENT)
Dept: RADIOLOGY | Facility: HOSPITAL | Age: 71
End: 2021-06-08
Attending: ORTHOPAEDIC SURGERY
Payer: MEDICARE

## 2021-06-08 VITALS
RESPIRATION RATE: 18 BRPM | WEIGHT: 182.75 LBS | DIASTOLIC BLOOD PRESSURE: 80 MMHG | BODY MASS INDEX: 31.2 KG/M2 | HEIGHT: 64 IN | SYSTOLIC BLOOD PRESSURE: 112 MMHG | OXYGEN SATURATION: 97 % | HEART RATE: 62 BPM

## 2021-06-08 DIAGNOSIS — M25.511 BILATERAL SHOULDER PAIN, UNSPECIFIED CHRONICITY: ICD-10-CM

## 2021-06-08 DIAGNOSIS — G89.29 CHRONIC RIGHT SHOULDER PAIN: Primary | ICD-10-CM

## 2021-06-08 DIAGNOSIS — M25.512 BILATERAL SHOULDER PAIN, UNSPECIFIED CHRONICITY: ICD-10-CM

## 2021-06-08 DIAGNOSIS — M25.511 CHRONIC RIGHT SHOULDER PAIN: Primary | ICD-10-CM

## 2021-06-08 PROCEDURE — 73030 X-RAY EXAM OF SHOULDER: CPT | Mod: 26,50,, | Performed by: RADIOLOGY

## 2021-06-08 PROCEDURE — 1159F PR MEDICATION LIST DOCUMENTED IN MEDICAL RECORD: ICD-10-PCS | Mod: S$GLB,,, | Performed by: ORTHOPAEDIC SURGERY

## 2021-06-08 PROCEDURE — 1159F MED LIST DOCD IN RCRD: CPT | Mod: S$GLB,,, | Performed by: ORTHOPAEDIC SURGERY

## 2021-06-08 PROCEDURE — 1101F PR PT FALLS ASSESS DOC 0-1 FALLS W/OUT INJ PAST YR: ICD-10-PCS | Mod: CPTII,S$GLB,, | Performed by: ORTHOPAEDIC SURGERY

## 2021-06-08 PROCEDURE — 73030 XR SHOULDER TRAUMA 3 VIEW BILATERAL: ICD-10-PCS | Mod: 26,50,, | Performed by: RADIOLOGY

## 2021-06-08 PROCEDURE — 3288F FALL RISK ASSESSMENT DOCD: CPT | Mod: CPTII,S$GLB,, | Performed by: ORTHOPAEDIC SURGERY

## 2021-06-08 PROCEDURE — 3008F BODY MASS INDEX DOCD: CPT | Mod: CPTII,S$GLB,, | Performed by: ORTHOPAEDIC SURGERY

## 2021-06-08 PROCEDURE — 3008F PR BODY MASS INDEX (BMI) DOCUMENTED: ICD-10-PCS | Mod: CPTII,S$GLB,, | Performed by: ORTHOPAEDIC SURGERY

## 2021-06-08 PROCEDURE — 1125F PR PAIN SEVERITY QUANTIFIED, PAIN PRESENT: ICD-10-PCS | Mod: S$GLB,,, | Performed by: ORTHOPAEDIC SURGERY

## 2021-06-08 PROCEDURE — 99999 PR PBB SHADOW E&M-EST. PATIENT-LVL V: CPT | Mod: PBBFAC,,, | Performed by: ORTHOPAEDIC SURGERY

## 2021-06-08 PROCEDURE — 1125F AMNT PAIN NOTED PAIN PRSNT: CPT | Mod: S$GLB,,, | Performed by: ORTHOPAEDIC SURGERY

## 2021-06-08 PROCEDURE — 73030 X-RAY EXAM OF SHOULDER: CPT | Mod: TC,50,FY,PN

## 2021-06-08 PROCEDURE — 1101F PT FALLS ASSESS-DOCD LE1/YR: CPT | Mod: CPTII,S$GLB,, | Performed by: ORTHOPAEDIC SURGERY

## 2021-06-08 PROCEDURE — 99214 PR OFFICE/OUTPT VISIT, EST, LEVL IV, 30-39 MIN: ICD-10-PCS | Mod: S$GLB,,, | Performed by: ORTHOPAEDIC SURGERY

## 2021-06-08 PROCEDURE — 99999 PR PBB SHADOW E&M-EST. PATIENT-LVL V: ICD-10-PCS | Mod: PBBFAC,,, | Performed by: ORTHOPAEDIC SURGERY

## 2021-06-08 PROCEDURE — 99214 OFFICE O/P EST MOD 30 MIN: CPT | Mod: S$GLB,,, | Performed by: ORTHOPAEDIC SURGERY

## 2021-06-08 PROCEDURE — 3288F PR FALLS RISK ASSESSMENT DOCUMENTED: ICD-10-PCS | Mod: CPTII,S$GLB,, | Performed by: ORTHOPAEDIC SURGERY

## 2021-06-08 RX ORDER — MELOXICAM 7.5 MG/1
7.5 TABLET ORAL DAILY
Qty: 14 TABLET | Refills: 0 | Status: SHIPPED | OUTPATIENT
Start: 2021-06-08

## 2021-06-08 RX ORDER — OMEPRAZOLE 20 MG/1
20 TABLET, DELAYED RELEASE ORAL DAILY
Qty: 15 TABLET | Refills: 0 | Status: SHIPPED | OUTPATIENT
Start: 2021-06-08 | End: 2021-09-08

## 2021-06-30 ENCOUNTER — HOSPITAL ENCOUNTER (OUTPATIENT)
Dept: RADIOLOGY | Facility: HOSPITAL | Age: 71
Discharge: HOME | End: 2021-06-30
Attending: ORTHOPAEDIC SURGERY
Payer: MEDICARE

## 2021-06-30 ENCOUNTER — CLINICAL SUPPORT (OUTPATIENT)
Dept: REHABILITATION | Facility: HOSPITAL | Age: 71
End: 2021-06-30
Attending: ORTHOPAEDIC SURGERY
Payer: MEDICARE

## 2021-06-30 DIAGNOSIS — G89.29 CHRONIC RIGHT SHOULDER PAIN: ICD-10-CM

## 2021-06-30 DIAGNOSIS — M25.511 CHRONIC RIGHT SHOULDER PAIN: ICD-10-CM

## 2021-06-30 DIAGNOSIS — G47.9 DISTURBANCE OF SLEEP PATTERN ASSOCIATED WITH PAIN: ICD-10-CM

## 2021-06-30 DIAGNOSIS — R52 DISTURBANCE OF SLEEP PATTERN ASSOCIATED WITH PAIN: ICD-10-CM

## 2021-06-30 DIAGNOSIS — M25.611 DECREASED RANGE OF MOTION OF RIGHT SHOULDER: ICD-10-CM

## 2021-06-30 DIAGNOSIS — Z74.1 SELF-CARE DEFICIT IN DRESSING: ICD-10-CM

## 2021-06-30 PROCEDURE — 97165 OT EVAL LOW COMPLEX 30 MIN: CPT | Mod: PN

## 2021-06-30 PROCEDURE — 73221 MRI JOINT UPR EXTREM W/O DYE: CPT | Mod: TC,RT

## 2021-06-30 PROCEDURE — 73221 MRI SHOULDER WITHOUT CONTRAST RIGHT: ICD-10-PCS | Mod: 26,RT,, | Performed by: RADIOLOGY

## 2021-06-30 PROCEDURE — 73221 MRI JOINT UPR EXTREM W/O DYE: CPT | Mod: 26,RT,, | Performed by: RADIOLOGY

## 2021-07-14 ENCOUNTER — CLINICAL SUPPORT (OUTPATIENT)
Dept: REHABILITATION | Facility: HOSPITAL | Age: 71
End: 2021-07-14
Attending: ORTHOPAEDIC SURGERY
Payer: MEDICARE

## 2021-07-14 DIAGNOSIS — R52 DISTURBANCE OF SLEEP PATTERN ASSOCIATED WITH PAIN: ICD-10-CM

## 2021-07-14 DIAGNOSIS — Z74.1 SELF-CARE DEFICIT IN DRESSING: ICD-10-CM

## 2021-07-14 DIAGNOSIS — G47.9 DISTURBANCE OF SLEEP PATTERN ASSOCIATED WITH PAIN: ICD-10-CM

## 2021-07-14 DIAGNOSIS — M25.611 DECREASED RANGE OF MOTION OF RIGHT SHOULDER: Primary | ICD-10-CM

## 2021-07-14 PROCEDURE — 97140 MANUAL THERAPY 1/> REGIONS: CPT | Mod: PN

## 2021-07-14 PROCEDURE — 97110 THERAPEUTIC EXERCISES: CPT | Mod: PN

## 2021-07-16 ENCOUNTER — CLINICAL SUPPORT (OUTPATIENT)
Dept: REHABILITATION | Facility: HOSPITAL | Age: 71
End: 2021-07-16
Attending: ORTHOPAEDIC SURGERY
Payer: MEDICARE

## 2021-07-16 DIAGNOSIS — G47.9 DISTURBANCE OF SLEEP PATTERN ASSOCIATED WITH PAIN: ICD-10-CM

## 2021-07-16 DIAGNOSIS — R52 DISTURBANCE OF SLEEP PATTERN ASSOCIATED WITH PAIN: ICD-10-CM

## 2021-07-16 DIAGNOSIS — M25.611 DECREASED RANGE OF MOTION OF RIGHT SHOULDER: Primary | ICD-10-CM

## 2021-07-16 DIAGNOSIS — Z74.1 SELF-CARE DEFICIT IN DRESSING: ICD-10-CM

## 2021-07-16 PROCEDURE — 97110 THERAPEUTIC EXERCISES: CPT | Mod: PN

## 2021-07-21 ENCOUNTER — CLINICAL SUPPORT (OUTPATIENT)
Dept: REHABILITATION | Facility: HOSPITAL | Age: 71
End: 2021-07-21
Attending: ORTHOPAEDIC SURGERY
Payer: MEDICARE

## 2021-07-21 DIAGNOSIS — Z74.1 SELF-CARE DEFICIT IN DRESSING: ICD-10-CM

## 2021-07-21 DIAGNOSIS — G47.9 DISTURBANCE OF SLEEP PATTERN ASSOCIATED WITH PAIN: ICD-10-CM

## 2021-07-21 DIAGNOSIS — R52 DISTURBANCE OF SLEEP PATTERN ASSOCIATED WITH PAIN: ICD-10-CM

## 2021-07-21 DIAGNOSIS — M25.611 DECREASED RANGE OF MOTION OF RIGHT SHOULDER: Primary | ICD-10-CM

## 2021-07-21 PROCEDURE — 97140 MANUAL THERAPY 1/> REGIONS: CPT | Mod: PN

## 2021-07-21 PROCEDURE — 97110 THERAPEUTIC EXERCISES: CPT | Mod: PN

## 2021-07-23 ENCOUNTER — CLINICAL SUPPORT (OUTPATIENT)
Dept: REHABILITATION | Facility: HOSPITAL | Age: 71
End: 2021-07-23
Attending: ORTHOPAEDIC SURGERY
Payer: MEDICARE

## 2021-07-23 DIAGNOSIS — R52 DISTURBANCE OF SLEEP PATTERN ASSOCIATED WITH PAIN: ICD-10-CM

## 2021-07-23 DIAGNOSIS — Z74.1 SELF-CARE DEFICIT IN DRESSING: ICD-10-CM

## 2021-07-23 DIAGNOSIS — G47.9 DISTURBANCE OF SLEEP PATTERN ASSOCIATED WITH PAIN: ICD-10-CM

## 2021-07-23 DIAGNOSIS — M25.611 DECREASED RANGE OF MOTION OF RIGHT SHOULDER: Primary | ICD-10-CM

## 2021-07-23 PROCEDURE — 97110 THERAPEUTIC EXERCISES: CPT | Mod: PN

## 2021-07-23 PROCEDURE — 97140 MANUAL THERAPY 1/> REGIONS: CPT | Mod: PN

## 2021-07-28 ENCOUNTER — CLINICAL SUPPORT (OUTPATIENT)
Dept: REHABILITATION | Facility: HOSPITAL | Age: 71
End: 2021-07-28
Attending: ORTHOPAEDIC SURGERY
Payer: MEDICARE

## 2021-07-28 DIAGNOSIS — M79.609 PARESTHESIA AND PAIN OF LEFT EXTREMITY: ICD-10-CM

## 2021-07-28 DIAGNOSIS — G47.9 DISTURBANCE OF SLEEP PATTERN ASSOCIATED WITH PAIN: ICD-10-CM

## 2021-07-28 DIAGNOSIS — R20.2 PARESTHESIA AND PAIN OF LEFT EXTREMITY: ICD-10-CM

## 2021-07-28 DIAGNOSIS — Z74.1 SELF-CARE DEFICIT IN DRESSING: ICD-10-CM

## 2021-07-28 DIAGNOSIS — M25.611 DECREASED RANGE OF MOTION OF RIGHT SHOULDER: Primary | ICD-10-CM

## 2021-07-28 DIAGNOSIS — R52 DISTURBANCE OF SLEEP PATTERN ASSOCIATED WITH PAIN: ICD-10-CM

## 2021-07-28 PROCEDURE — 97140 MANUAL THERAPY 1/> REGIONS: CPT | Mod: PN

## 2021-07-28 PROCEDURE — 97110 THERAPEUTIC EXERCISES: CPT | Mod: PN

## 2021-07-30 ENCOUNTER — CLINICAL SUPPORT (OUTPATIENT)
Dept: REHABILITATION | Facility: HOSPITAL | Age: 71
End: 2021-07-30
Attending: ORTHOPAEDIC SURGERY
Payer: MEDICARE

## 2021-07-30 DIAGNOSIS — G47.9 DISTURBANCE OF SLEEP PATTERN ASSOCIATED WITH PAIN: ICD-10-CM

## 2021-07-30 DIAGNOSIS — R52 DISTURBANCE OF SLEEP PATTERN ASSOCIATED WITH PAIN: ICD-10-CM

## 2021-07-30 DIAGNOSIS — Z74.1 SELF-CARE DEFICIT IN DRESSING: ICD-10-CM

## 2021-07-30 DIAGNOSIS — M25.611 DECREASED RANGE OF MOTION OF RIGHT SHOULDER: Primary | ICD-10-CM

## 2021-07-30 PROCEDURE — 97110 THERAPEUTIC EXERCISES: CPT | Mod: PN

## 2021-08-02 ENCOUNTER — CLINICAL SUPPORT (OUTPATIENT)
Dept: REHABILITATION | Facility: HOSPITAL | Age: 71
End: 2021-08-02
Attending: ORTHOPAEDIC SURGERY
Payer: MEDICARE

## 2021-08-02 DIAGNOSIS — G47.9 DISTURBANCE OF SLEEP PATTERN ASSOCIATED WITH PAIN: ICD-10-CM

## 2021-08-02 DIAGNOSIS — R52 DISTURBANCE OF SLEEP PATTERN ASSOCIATED WITH PAIN: ICD-10-CM

## 2021-08-02 DIAGNOSIS — M25.611 DECREASED RANGE OF MOTION OF RIGHT SHOULDER: Primary | ICD-10-CM

## 2021-08-02 DIAGNOSIS — Z74.1 SELF-CARE DEFICIT IN DRESSING: ICD-10-CM

## 2021-08-02 PROCEDURE — 97110 THERAPEUTIC EXERCISES: CPT | Mod: PN

## 2021-08-04 ENCOUNTER — CLINICAL SUPPORT (OUTPATIENT)
Dept: REHABILITATION | Facility: HOSPITAL | Age: 71
End: 2021-08-04
Attending: ORTHOPAEDIC SURGERY
Payer: MEDICARE

## 2021-08-04 DIAGNOSIS — Z74.1 SELF-CARE DEFICIT IN DRESSING: ICD-10-CM

## 2021-08-04 DIAGNOSIS — R52 DISTURBANCE OF SLEEP PATTERN ASSOCIATED WITH PAIN: ICD-10-CM

## 2021-08-04 DIAGNOSIS — G47.9 DISTURBANCE OF SLEEP PATTERN ASSOCIATED WITH PAIN: ICD-10-CM

## 2021-08-04 DIAGNOSIS — M25.611 DECREASED RANGE OF MOTION OF RIGHT SHOULDER: Primary | ICD-10-CM

## 2021-08-04 PROCEDURE — 97110 THERAPEUTIC EXERCISES: CPT | Mod: PN

## 2021-08-11 ENCOUNTER — CLINICAL SUPPORT (OUTPATIENT)
Dept: REHABILITATION | Facility: HOSPITAL | Age: 71
End: 2021-08-11
Attending: ORTHOPAEDIC SURGERY
Payer: MEDICARE

## 2021-08-11 DIAGNOSIS — Z74.1 SELF-CARE DEFICIT IN DRESSING: ICD-10-CM

## 2021-08-11 DIAGNOSIS — M25.611 DECREASED RANGE OF MOTION OF RIGHT SHOULDER: Primary | ICD-10-CM

## 2021-08-11 DIAGNOSIS — R52 DISTURBANCE OF SLEEP PATTERN ASSOCIATED WITH PAIN: ICD-10-CM

## 2021-08-11 DIAGNOSIS — G47.9 DISTURBANCE OF SLEEP PATTERN ASSOCIATED WITH PAIN: ICD-10-CM

## 2021-08-11 PROCEDURE — 97110 THERAPEUTIC EXERCISES: CPT | Mod: PN

## 2021-08-13 ENCOUNTER — CLINICAL SUPPORT (OUTPATIENT)
Dept: REHABILITATION | Facility: HOSPITAL | Age: 71
End: 2021-08-13
Attending: ORTHOPAEDIC SURGERY
Payer: MEDICARE

## 2021-08-13 DIAGNOSIS — R52 DISTURBANCE OF SLEEP PATTERN ASSOCIATED WITH PAIN: ICD-10-CM

## 2021-08-13 DIAGNOSIS — Z74.1 SELF-CARE DEFICIT IN DRESSING: ICD-10-CM

## 2021-08-13 DIAGNOSIS — M25.611 DECREASED RANGE OF MOTION OF RIGHT SHOULDER: Primary | ICD-10-CM

## 2021-08-13 DIAGNOSIS — G47.9 DISTURBANCE OF SLEEP PATTERN ASSOCIATED WITH PAIN: ICD-10-CM

## 2021-08-13 PROCEDURE — 97110 THERAPEUTIC EXERCISES: CPT | Mod: PN

## 2021-08-20 ENCOUNTER — CLINICAL SUPPORT (OUTPATIENT)
Dept: REHABILITATION | Facility: HOSPITAL | Age: 71
End: 2021-08-20
Attending: ORTHOPAEDIC SURGERY
Payer: MEDICARE

## 2021-08-20 DIAGNOSIS — M25.611 DECREASED RANGE OF MOTION OF RIGHT SHOULDER: Primary | ICD-10-CM

## 2021-08-20 DIAGNOSIS — R52 DISTURBANCE OF SLEEP PATTERN ASSOCIATED WITH PAIN: ICD-10-CM

## 2021-08-20 DIAGNOSIS — Z74.1 SELF-CARE DEFICIT IN DRESSING: ICD-10-CM

## 2021-08-20 DIAGNOSIS — G47.9 DISTURBANCE OF SLEEP PATTERN ASSOCIATED WITH PAIN: ICD-10-CM

## 2021-08-20 PROCEDURE — 97110 THERAPEUTIC EXERCISES: CPT | Mod: PN

## 2021-09-08 ENCOUNTER — OFFICE VISIT (OUTPATIENT)
Dept: ORTHOPEDICS | Facility: CLINIC | Age: 71
End: 2021-09-08
Payer: MEDICARE

## 2021-09-08 ENCOUNTER — PATIENT MESSAGE (OUTPATIENT)
Dept: ORTHOPEDICS | Facility: CLINIC | Age: 71
End: 2021-09-08

## 2021-09-08 VITALS
OXYGEN SATURATION: 98 % | DIASTOLIC BLOOD PRESSURE: 78 MMHG | RESPIRATION RATE: 18 BRPM | HEART RATE: 56 BPM | SYSTOLIC BLOOD PRESSURE: 148 MMHG | WEIGHT: 184.31 LBS | BODY MASS INDEX: 31.47 KG/M2 | HEIGHT: 64 IN

## 2021-09-08 DIAGNOSIS — M25.511 CHRONIC RIGHT SHOULDER PAIN: Primary | ICD-10-CM

## 2021-09-08 DIAGNOSIS — G89.29 CHRONIC RIGHT SHOULDER PAIN: Primary | ICD-10-CM

## 2021-09-08 PROCEDURE — 3288F FALL RISK ASSESSMENT DOCD: CPT | Mod: CPTII,S$GLB,, | Performed by: ORTHOPAEDIC SURGERY

## 2021-09-08 PROCEDURE — 99999 PR PBB SHADOW E&M-EST. PATIENT-LVL IV: ICD-10-PCS | Mod: PBBFAC,,, | Performed by: ORTHOPAEDIC SURGERY

## 2021-09-08 PROCEDURE — 1160F PR REVIEW ALL MEDS BY PRESCRIBER/CLIN PHARMACIST DOCUMENTED: ICD-10-PCS | Mod: CPTII,S$GLB,, | Performed by: ORTHOPAEDIC SURGERY

## 2021-09-08 PROCEDURE — 99213 OFFICE O/P EST LOW 20 MIN: CPT | Mod: S$GLB,,, | Performed by: ORTHOPAEDIC SURGERY

## 2021-09-08 PROCEDURE — 3078F DIAST BP <80 MM HG: CPT | Mod: CPTII,S$GLB,, | Performed by: ORTHOPAEDIC SURGERY

## 2021-09-08 PROCEDURE — 4010F PR ACE/ARB THEARPY RXD/TAKEN: ICD-10-PCS | Mod: CPTII,S$GLB,, | Performed by: ORTHOPAEDIC SURGERY

## 2021-09-08 PROCEDURE — 3078F PR MOST RECENT DIASTOLIC BLOOD PRESSURE < 80 MM HG: ICD-10-PCS | Mod: CPTII,S$GLB,, | Performed by: ORTHOPAEDIC SURGERY

## 2021-09-08 PROCEDURE — 99213 PR OFFICE/OUTPT VISIT, EST, LEVL III, 20-29 MIN: ICD-10-PCS | Mod: S$GLB,,, | Performed by: ORTHOPAEDIC SURGERY

## 2021-09-08 PROCEDURE — 3008F PR BODY MASS INDEX (BMI) DOCUMENTED: ICD-10-PCS | Mod: CPTII,S$GLB,, | Performed by: ORTHOPAEDIC SURGERY

## 2021-09-08 PROCEDURE — 1160F RVW MEDS BY RX/DR IN RCRD: CPT | Mod: CPTII,S$GLB,, | Performed by: ORTHOPAEDIC SURGERY

## 2021-09-08 PROCEDURE — 1125F PR PAIN SEVERITY QUANTIFIED, PAIN PRESENT: ICD-10-PCS | Mod: CPTII,S$GLB,, | Performed by: ORTHOPAEDIC SURGERY

## 2021-09-08 PROCEDURE — 1101F PT FALLS ASSESS-DOCD LE1/YR: CPT | Mod: CPTII,S$GLB,, | Performed by: ORTHOPAEDIC SURGERY

## 2021-09-08 PROCEDURE — 99999 PR PBB SHADOW E&M-EST. PATIENT-LVL IV: CPT | Mod: PBBFAC,,, | Performed by: ORTHOPAEDIC SURGERY

## 2021-09-08 PROCEDURE — 1101F PR PT FALLS ASSESS DOC 0-1 FALLS W/OUT INJ PAST YR: ICD-10-PCS | Mod: CPTII,S$GLB,, | Performed by: ORTHOPAEDIC SURGERY

## 2021-09-08 PROCEDURE — 1159F MED LIST DOCD IN RCRD: CPT | Mod: CPTII,S$GLB,, | Performed by: ORTHOPAEDIC SURGERY

## 2021-09-08 PROCEDURE — 4010F ACE/ARB THERAPY RXD/TAKEN: CPT | Mod: CPTII,S$GLB,, | Performed by: ORTHOPAEDIC SURGERY

## 2021-09-08 PROCEDURE — 1159F PR MEDICATION LIST DOCUMENTED IN MEDICAL RECORD: ICD-10-PCS | Mod: CPTII,S$GLB,, | Performed by: ORTHOPAEDIC SURGERY

## 2021-09-08 PROCEDURE — 1125F AMNT PAIN NOTED PAIN PRSNT: CPT | Mod: CPTII,S$GLB,, | Performed by: ORTHOPAEDIC SURGERY

## 2021-09-08 PROCEDURE — 3077F PR MOST RECENT SYSTOLIC BLOOD PRESSURE >= 140 MM HG: ICD-10-PCS | Mod: CPTII,S$GLB,, | Performed by: ORTHOPAEDIC SURGERY

## 2021-09-08 PROCEDURE — 3008F BODY MASS INDEX DOCD: CPT | Mod: CPTII,S$GLB,, | Performed by: ORTHOPAEDIC SURGERY

## 2021-09-08 PROCEDURE — 3077F SYST BP >= 140 MM HG: CPT | Mod: CPTII,S$GLB,, | Performed by: ORTHOPAEDIC SURGERY

## 2021-09-08 PROCEDURE — 3288F PR FALLS RISK ASSESSMENT DOCUMENTED: ICD-10-PCS | Mod: CPTII,S$GLB,, | Performed by: ORTHOPAEDIC SURGERY

## 2021-09-08 RX ORDER — DICLOFENAC SODIUM 50 MG/1
50 TABLET, DELAYED RELEASE ORAL 2 TIMES DAILY PRN
Qty: 60 TABLET | Refills: 0 | Status: SHIPPED | OUTPATIENT
Start: 2021-09-08 | End: 2021-09-21 | Stop reason: SDUPTHER

## 2021-09-21 RX ORDER — DICLOFENAC SODIUM 50 MG/1
50 TABLET, DELAYED RELEASE ORAL 2 TIMES DAILY PRN
Qty: 60 TABLET | Refills: 0 | Status: SHIPPED | OUTPATIENT
Start: 2021-09-21 | End: 2021-10-14

## 2021-11-08 ENCOUNTER — TELEPHONE (OUTPATIENT)
Dept: UROLOGY | Facility: CLINIC | Age: 71
End: 2021-11-08
Payer: MEDICARE

## 2021-11-11 ENCOUNTER — DOCUMENTATION ONLY (OUTPATIENT)
Dept: REHABILITATION | Facility: HOSPITAL | Age: 71
End: 2021-11-11
Payer: MEDICARE

## 2021-11-11 DIAGNOSIS — Z74.1 SELF-CARE DEFICIT IN DRESSING: ICD-10-CM

## 2021-11-11 DIAGNOSIS — G47.9 DISTURBANCE OF SLEEP PATTERN ASSOCIATED WITH PAIN: ICD-10-CM

## 2021-11-11 DIAGNOSIS — R52 DISTURBANCE OF SLEEP PATTERN ASSOCIATED WITH PAIN: ICD-10-CM

## 2021-11-11 DIAGNOSIS — M25.611 DECREASED RANGE OF MOTION OF RIGHT SHOULDER: Primary | ICD-10-CM

## 2023-04-24 NOTE — PROGRESS NOTES
" Samira Moon is a 73 y.o. female who is an established pt who was referred by Dr Goodson  for evaluation of renal cyst and abnormal bladder US.      She underwent renal complete US for known renal cysts. ANGY (10/16/14) showed 5cm exophytic cyst in R upper pole. No L renal cysts identified. No hydronephrosis. No stones noted. US did mention "layering hyperechogenicity within bladder lumen, which could represent debris within the bladder lumen. No bladder wall thickening is clearly identified." She was sent for possible cystoscopy for this.     She does not notice sediment in urine. No issues with UTI. Rare UUI, not bothersome. No hematuria, dysuria. Cr - 0.67.    Ucx and cytology negative from last visit. No LUTS.    Complete ANGY was done 12/15/15 that showed large pelvic mass. CT was then ordered that showed 7.6cm mass that was likely an ovarian teratoma (12/30/15). She was notified via telephone. She was recommended to see gyn for this. She is s/p hysterectomy.     ANGY also showed suspicious for 1.1cm stone in LLP. Large simple cyst in R UP (5.1cm - stable). 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     ANGY at  5/17 - 5.5cm simple cyst in R UP, no comment on any stones.   ANGY 12/19 - wnl, simple cyst  ANGY 12/20 - wnl, simple cyst (6.8cm RUP cyst)    12/2/2019  Last seen > 2 years ago. No recent imaging performed. Denies any  issues.     5/1/2023  Denies LUTS. Here for evaluation of large simple cyst as recommended by her PCP. Last imaging 2020.      The following portions of the patient's history were reviewed and updated as appropriate: allergies, current medications, past family history, past medical history, past social history, past surgical history and problem list.    Review of Systems  Constitutional: no fever or chills  ENT: no nasal congestion or sore throat  Respiratory: no cough or shortness of breath  Cardiovascular: " no chest pain or palpitations  Gastrointestinal: no nausea or vomiting, tolerating diet  Genitourinary: as per HPI  Hematologic/Lymphatic: no easy bruising or lymphadenopathy  Musculoskeletal: no arthralgias or myalgias  Skin: no rashes or lesions  Neurological: no seizures or tremors  Behavioral/Psych: no auditory or visual hallucinations       Objective:    Vitals: There were no vitals taken for this visit.    Physical Exam   General: well developed, well nourished in no acute distress  Head: normocephalic, atraumatic  Neck: supple, trachea midline  HEENT: EOMI, mucus membranes moist, sclera anicteric, no hearing impairment  Lungs: symmetric expansion, non-labored breathing  Skin: no rashes or lesions  Neuro: alert and oriented x 3, no gross deficits  Psych: normal judgment and insight, normal mood/affect and non-anxious  Genitourinary:   patient declined exam      Lab Review   Urine analysis shows positive for trace protein    Lab Results   Component Value Date    WBC 4.92 02/20/2009    HGB 13.1 02/20/2009    HCT 40.3 02/20/2009    MCV 84.7 02/20/2009     02/20/2009     Lab Results   Component Value Date    CREATININE 0.7 12/30/2015    BUN 9 05/15/2008       Imaging  ANGY/CT reviewed         Assessment/Plan:      1. Renal cyst   - Large R simple cyst. Discussed no malignant potential. She would like to continue surveillance. ANGY ordered.    - Calcification on US appears to be vascular rather than in collecting system   - No need for cystoscopy at this time.         2. Ovarian teratoma   - Found on abdominal imaing   - s/p REE-BSO 2016      Follow up in 1 month with ANGY

## 2023-05-01 ENCOUNTER — OFFICE VISIT (OUTPATIENT)
Dept: UROLOGY | Facility: CLINIC | Age: 73
End: 2023-05-01
Payer: MEDICARE

## 2023-05-01 DIAGNOSIS — D27.9 TERATOMA OF OVARY, UNSPECIFIED LATERALITY: ICD-10-CM

## 2023-05-01 DIAGNOSIS — N28.1 RENAL CYST, ACQUIRED, RIGHT: Primary | ICD-10-CM

## 2023-05-01 PROCEDURE — 99999 PR PBB SHADOW E&M-EST. PATIENT-LVL III: CPT | Mod: PBBFAC,,, | Performed by: UROLOGY

## 2023-05-01 PROCEDURE — 4010F PR ACE/ARB THEARPY RXD/TAKEN: ICD-10-PCS | Mod: CPTII,S$GLB,, | Performed by: UROLOGY

## 2023-05-01 PROCEDURE — 1160F RVW MEDS BY RX/DR IN RCRD: CPT | Mod: CPTII,S$GLB,, | Performed by: UROLOGY

## 2023-05-01 PROCEDURE — 1159F PR MEDICATION LIST DOCUMENTED IN MEDICAL RECORD: ICD-10-PCS | Mod: CPTII,S$GLB,, | Performed by: UROLOGY

## 2023-05-01 PROCEDURE — 99999 PR PBB SHADOW E&M-EST. PATIENT-LVL III: ICD-10-PCS | Mod: PBBFAC,,, | Performed by: UROLOGY

## 2023-05-01 PROCEDURE — 1160F PR REVIEW ALL MEDS BY PRESCRIBER/CLIN PHARMACIST DOCUMENTED: ICD-10-PCS | Mod: CPTII,S$GLB,, | Performed by: UROLOGY

## 2023-05-01 PROCEDURE — 99203 OFFICE O/P NEW LOW 30 MIN: CPT | Mod: S$GLB,,, | Performed by: UROLOGY

## 2023-05-01 PROCEDURE — 1159F MED LIST DOCD IN RCRD: CPT | Mod: CPTII,S$GLB,, | Performed by: UROLOGY

## 2023-05-01 PROCEDURE — 99203 PR OFFICE/OUTPT VISIT, NEW, LEVL III, 30-44 MIN: ICD-10-PCS | Mod: S$GLB,,, | Performed by: UROLOGY

## 2023-05-01 PROCEDURE — 4010F ACE/ARB THERAPY RXD/TAKEN: CPT | Mod: CPTII,S$GLB,, | Performed by: UROLOGY

## 2023-05-01 NOTE — LETTER
May 1, 2023        Carley Goodson MD  [address] [place] [address]  [id number]  [place] [id number]             [facility] [place] - Urology  [address]  [place] [address]  Phone: [phone]  Fax: [phone]   Patient: Samira Moon   MR Number: [id number]   YOB: 1950   Date of Visit: 5/1/2023       Dear Dr. Goodson:    Thank you for referring Samira Moon to me for evaluation. Attached you will find relevant portions of my assessment and plan of care.    If you have questions, please do not hesitate to call me. I look forward to following Samira Moon along with you.    Sincerely,      Poppy Galeana MD            CC    No Recipients    Enclosure

## 2023-05-02 ENCOUNTER — TELEPHONE (OUTPATIENT)
Dept: UROLOGY | Facility: CLINIC | Age: 73
End: 2023-05-02
Payer: MEDICARE

## 2023-05-02 NOTE — TELEPHONE ENCOUNTER
Pt notified appt on 6/22/23 has been moved up to 11am      ----- Message from Jessiecira Pacheco sent at 5/2/2023  4:29 PM CDT -----  Regarding: Earlier Appointment  Name of Who is Calling:  Patient          What is the request in detail:  Patient stated she has another appointment at the time of her Urology and would like to know if she can get an earlier appointment about 10 or 11            Can the clinic reply by [facility]: No            What Number to Call Back if not in [facility]: [phone]

## 2023-05-11 ENCOUNTER — HOSPITAL ENCOUNTER (OUTPATIENT)
Dept: RADIOLOGY | Facility: HOSPITAL | Age: 73
Discharge: HOME | End: 2023-05-11
Attending: UROLOGY
Payer: MEDICARE

## 2023-05-11 DIAGNOSIS — N28.1 RENAL CYST, ACQUIRED, RIGHT: ICD-10-CM

## 2023-05-11 PROCEDURE — 76770 US RETROPERITONEAL COMPLETE: ICD-10-PCS | Mod: 26,,, | Performed by: RADIOLOGY

## 2023-05-11 PROCEDURE — 76770 US EXAM ABDO BACK WALL COMP: CPT | Mod: TC

## 2023-05-11 PROCEDURE — 76770 US EXAM ABDO BACK WALL COMP: CPT | Mod: 26,,, | Performed by: RADIOLOGY

## 2023-06-22 ENCOUNTER — OFFICE VISIT (OUTPATIENT)
Dept: UROLOGY | Facility: CLINIC | Age: 73
End: 2023-06-22
Payer: MEDICARE

## 2023-06-22 VITALS — BODY MASS INDEX: 31.48 KG/M2 | WEIGHT: 183.44 LBS

## 2023-06-22 DIAGNOSIS — D27.9 TERATOMA OF OVARY, UNSPECIFIED LATERALITY: ICD-10-CM

## 2023-06-22 DIAGNOSIS — N28.1 RENAL CYST, ACQUIRED, RIGHT: Primary | ICD-10-CM

## 2023-06-22 PROCEDURE — 1101F PR PT FALLS ASSESS DOC 0-1 FALLS W/OUT INJ PAST YR: ICD-10-PCS | Mod: CPTII,S$GLB,, | Performed by: UROLOGY

## 2023-06-22 PROCEDURE — 99213 PR OFFICE/OUTPT VISIT, EST, LEVL III, 20-29 MIN: ICD-10-PCS | Mod: S$GLB,,, | Performed by: UROLOGY

## 2023-06-22 PROCEDURE — 99999 PR PBB SHADOW E&M-EST. PATIENT-LVL II: CPT | Mod: PBBFAC,,, | Performed by: UROLOGY

## 2023-06-22 PROCEDURE — 1126F AMNT PAIN NOTED NONE PRSNT: CPT | Mod: CPTII,S$GLB,, | Performed by: UROLOGY

## 2023-06-22 PROCEDURE — 3008F PR BODY MASS INDEX (BMI) DOCUMENTED: ICD-10-PCS | Mod: CPTII,S$GLB,, | Performed by: UROLOGY

## 2023-06-22 PROCEDURE — 1159F MED LIST DOCD IN RCRD: CPT | Mod: CPTII,S$GLB,, | Performed by: UROLOGY

## 2023-06-22 PROCEDURE — 1160F RVW MEDS BY RX/DR IN RCRD: CPT | Mod: CPTII,S$GLB,, | Performed by: UROLOGY

## 2023-06-22 PROCEDURE — 4010F PR ACE/ARB THEARPY RXD/TAKEN: ICD-10-PCS | Mod: CPTII,S$GLB,, | Performed by: UROLOGY

## 2023-06-22 PROCEDURE — 1160F PR REVIEW ALL MEDS BY PRESCRIBER/CLIN PHARMACIST DOCUMENTED: ICD-10-PCS | Mod: CPTII,S$GLB,, | Performed by: UROLOGY

## 2023-06-22 PROCEDURE — 1159F PR MEDICATION LIST DOCUMENTED IN MEDICAL RECORD: ICD-10-PCS | Mod: CPTII,S$GLB,, | Performed by: UROLOGY

## 2023-06-22 PROCEDURE — 1101F PT FALLS ASSESS-DOCD LE1/YR: CPT | Mod: CPTII,S$GLB,, | Performed by: UROLOGY

## 2023-06-22 PROCEDURE — 3008F BODY MASS INDEX DOCD: CPT | Mod: CPTII,S$GLB,, | Performed by: UROLOGY

## 2023-06-22 PROCEDURE — 4010F ACE/ARB THERAPY RXD/TAKEN: CPT | Mod: CPTII,S$GLB,, | Performed by: UROLOGY

## 2023-06-22 PROCEDURE — 1126F PR PAIN SEVERITY QUANTIFIED, NO PAIN PRESENT: ICD-10-PCS | Mod: CPTII,S$GLB,, | Performed by: UROLOGY

## 2023-06-22 PROCEDURE — 3288F FALL RISK ASSESSMENT DOCD: CPT | Mod: CPTII,S$GLB,, | Performed by: UROLOGY

## 2023-06-22 PROCEDURE — 3288F PR FALLS RISK ASSESSMENT DOCUMENTED: ICD-10-PCS | Mod: CPTII,S$GLB,, | Performed by: UROLOGY

## 2023-06-22 PROCEDURE — 99213 OFFICE O/P EST LOW 20 MIN: CPT | Mod: S$GLB,,, | Performed by: UROLOGY

## 2023-06-22 PROCEDURE — 99999 PR PBB SHADOW E&M-EST. PATIENT-LVL II: ICD-10-PCS | Mod: PBBFAC,,, | Performed by: UROLOGY

## 2023-06-22 RX ORDER — GLUCOSAM/CHON-MSM1/C/MANG/BOSW 750-644 MG
TABLET ORAL
COMMUNITY

## 2023-06-22 NOTE — PROGRESS NOTES
" Samira Moon is a 73 y.o. female who is an established pt who was referred by Dr Goodson  for evaluation of renal cyst and abnormal bladder US.      She underwent renal complete US for known renal cysts. ANGY (10/16/14) showed 5cm exophytic cyst in R upper pole. No L renal cysts identified. No hydronephrosis. No stones noted. US did mention "layering hyperechogenicity within bladder lumen, which could represent debris within the bladder lumen. No bladder wall thickening is clearly identified." She was sent for possible cystoscopy for this.     She does not notice sediment in urine. No issues with UTI. Rare UUI, not bothersome. No hematuria, dysuria. Cr - 0.67.    Ucx and cytology negative from last visit. No LUTS.    Complete ANGY was done 12/15/15 that showed large pelvic mass. CT was then ordered that showed 7.6cm mass that was likely an ovarian teratoma (12/30/15). She was notified via telephone. She was recommended to see gyn for this. She is s/p hysterectomy.     ANGY also showed suspicious for 1.1cm stone in LLP. Large simple cyst in R UP (5.1cm - stable). 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     ANGY at  5/17 - 5.5cm simple cyst in R UP, no comment on any stones.   ANGY 12/19 - wnl, simple cyst  ANGY 12/20 - wnl, simple cyst (6.8cm RUP cyst)  ANGY 5/23 - 6.0cm R simple cyst. No hydro/mass.     12/2/2019  Last seen > 2 years ago. No recent imaging performed. Denies any  issues.     5/1/2023  Denies LUTS. Here for evaluation of large simple cyst as recommended by her PCP. Last imaging 2020.    6/23/2023  Recent ANGY with stable renal cyst. No significant change.       The following portions of the patient's history were reviewed and updated as appropriate: allergies, current medications, past family history, past medical history, past social history, past surgical history and problem list.    Review of Systems  Constitutional: " no fever or chills  ENT: no nasal congestion or sore throat  Respiratory: no cough or shortness of breath  Cardiovascular: no chest pain or palpitations  Gastrointestinal: no nausea or vomiting, tolerating diet  Genitourinary: as per HPI  Hematologic/Lymphatic: no easy bruising or lymphadenopathy  Musculoskeletal: no arthralgias or myalgias  Skin: no rashes or lesions  Neurological: no seizures or tremors  Behavioral/Psych: no auditory or visual hallucinations       Objective:    Vitals: Wt 83.2 kg (183 lb 6.8 oz)   BMI 31.48 kg/m²     Physical Exam   General: well developed, well nourished in no acute distress  Head: normocephalic, atraumatic  Neck: supple, trachea midline  HEENT: EOMI, mucus membranes moist, sclera anicteric, no hearing impairment  Lungs: symmetric expansion, non-labored breathing  Skin: no rashes or lesions  Neuro: alert and oriented x 3, no gross deficits  Psych: normal judgment and insight, normal mood/affect and non-anxious  Genitourinary:   patient declined exam      Lab Review   Urine analysis shows positive for trace protein    Lab Results   Component Value Date    WBC 4.92 02/20/2009    HGB 13.1 02/20/2009    HCT 40.3 02/20/2009    MCV 84.7 02/20/2009     02/20/2009     Lab Results   Component Value Date    CREATININE 0.7 12/30/2015    BUN 9 05/15/2008       Imaging  ANGY/CT reviewed         Assessment/Plan:      1. Renal cyst   - Large R simple cyst. Discussed no malignant potential. She would like to continue surveillance. ANGY ordered.    - Calcification on US appears to be vascular rather than in collecting system   - No need for cystoscopy at this time.     - Repeat ANGY in 2 year         2. Ovarian teratoma   - Found on abdominal imaing   - s/p REE-BSO 2016      Follow up in 2 years with ANGY